# Patient Record
Sex: MALE | Race: WHITE | NOT HISPANIC OR LATINO | Employment: UNEMPLOYED | URBAN - METROPOLITAN AREA
[De-identification: names, ages, dates, MRNs, and addresses within clinical notes are randomized per-mention and may not be internally consistent; named-entity substitution may affect disease eponyms.]

---

## 2018-05-07 DIAGNOSIS — R55 SYNCOPE AND COLLAPSE: Primary | ICD-10-CM

## 2018-08-13 PROBLEM — T14.8XXA ABRASION: Status: ACTIVE | Noted: 2018-08-13

## 2018-12-12 ENCOUNTER — OFFICE VISIT (OUTPATIENT)
Dept: PODIATRY | Facility: CLINIC | Age: 38
End: 2018-12-12
Payer: MEDICAID

## 2018-12-12 VITALS
SYSTOLIC BLOOD PRESSURE: 135 MMHG | WEIGHT: 315 LBS | BODY MASS INDEX: 38.36 KG/M2 | HEIGHT: 76 IN | HEART RATE: 66 BPM | DIASTOLIC BLOOD PRESSURE: 84 MMHG

## 2018-12-12 DIAGNOSIS — B35.1 ONYCHOMYCOSIS DUE TO DERMATOPHYTE: ICD-10-CM

## 2018-12-12 DIAGNOSIS — L60.0 INGROWN NAIL OF GREAT TOE OF LEFT FOOT: Primary | ICD-10-CM

## 2018-12-12 PROCEDURE — 99999 PR PBB SHADOW E&M-EST. PATIENT-LVL III: CPT | Mod: PBBFAC,,, | Performed by: PODIATRIST

## 2018-12-12 PROCEDURE — 99202 OFFICE O/P NEW SF 15 MIN: CPT | Mod: S$PBB,,, | Performed by: PODIATRIST

## 2018-12-12 PROCEDURE — 99213 OFFICE O/P EST LOW 20 MIN: CPT | Mod: PBBFAC,PN | Performed by: PODIATRIST

## 2018-12-12 RX ORDER — LEVETIRACETAM 1000 MG/1
TABLET ORAL
Refills: 5 | COMMUNITY
Start: 2018-11-15

## 2018-12-12 RX ORDER — CARBAMAZEPINE 200 MG/1
CAPSULE, EXTENDED RELEASE ORAL
Refills: 3 | COMMUNITY
Start: 2018-12-09

## 2018-12-12 NOTE — PROGRESS NOTES
Subjective:      Patient ID: Dustin Crandall Jr. is a 38 y.o. male.    Chief Complaint: Nail Problem (Nail discoloration); Gout (Possible gout - left); and Other Misc (PCP:  Dr Benites - Dec 2018)    Dustin is a 38 y.o. male who presents to the clinic complaining of painful ingrown toenail on the left foot great toe medial border, there had been erythema and drainage to the area. He was put on an antibiotic and had been soaking the toe, he removed a large piece of nail from the area as well and it has been improving daily, he reports no pain to the area at this time. No other acute pedal complaints.     Review of Systems   Constitution: Negative for chills and fever.   Cardiovascular: Negative for claudication and leg swelling.   Respiratory: Negative for shortness of breath.    Skin: Positive for nail changes. Negative for itching and rash.   Musculoskeletal: Negative for muscle cramps, muscle weakness and myalgias.   Gastrointestinal: Negative for nausea and vomiting.   Neurological: Negative for focal weakness, loss of balance, numbness and paresthesias.           Objective:      Physical Exam   Constitutional: He is oriented to person, place, and time. He appears well-developed and well-nourished. No distress.   Cardiovascular:   Pulses:       Dorsalis pedis pulses are 2+ on the right side, and 2+ on the left side.        Posterior tibial pulses are 2+ on the right side, and 2+ on the left side.   < 3 sec capillary refill time to toes 1-5 bilateral. Toes and feet are warm to touch proximally with normal distal cooling b/l. There is some hair growth on the feet and toes b/l. There is no edema b/l. No spider veins or varicosities present b/l.      Musculoskeletal:   Equinus noted b/l ankles with < 10 deg DF noted. MMT 5/5 in DF/PF/Inv/Ev resistance with no reproduction of pain in any direction. Passive range of motion of ankle and pedal joints is painless b/l.     Neurological: He is alert and oriented to  person, place, and time. He has normal strength. He displays no atrophy and no tremor. No sensory deficit. He exhibits normal muscle tone.   Negative tinel sign bilateral.   Skin: Skin is warm, dry and intact. No abrasion, no bruising, no burn, no ecchymosis, no laceration, no lesion, no petechiae and no rash noted. He is not diaphoretic. No cyanosis or erythema. No pallor. Nails show no clubbing.   Skin temperature, texture and turgor within normal limits.    Medial hallux nail margin of left foot with ingrown nail plate. No surrounding erythema and minimal edema is noted there is no granuloma formation noted. No drainage or malodor.     Nails 1-5 bilateral are thick 3-4 mm, long 3-6 mm, and discolored with subungual debris to the bilateral hallux nails.             Psychiatric: He has a normal mood and affect. His behavior is normal.             Assessment:       Encounter Diagnoses   Name Primary?    Ingrown nail of great toe of left foot Yes    Onychomycosis due to dermatophyte          Plan:       Dustin was seen today for nail problem, gout and other misc.    Diagnoses and all orders for this visit:    Ingrown nail of great toe of left foot    Onychomycosis due to dermatophyte      I counseled the patient on his conditions, their implications and medical management.    For now the ingrown nail is not bothering him and the redness has resolved with the antibiotics.    Discussed in depth PNA with phenol matrixectomy if the nail bothers him again in the future, he will return at first sign the ingrown nail is back    Discussed treatment options for fungal toenails to include topical vs oral vs laser vs combined therapy in detail. Declined treatments    Return PRN    Efrain Irving DPM

## 2018-12-12 NOTE — LETTER
December 13, 2018      Samson Benites MD  [address] [place] [address] [address]  [facility] [address]           [facility]  [address] [facility] [address] [place] [address]  Phone: [phone]          Patient: Dustin Crandall Jr.   MR Number: [id number]   YOB: 1980   Date of Visit: 12/12/2018       Dear Dr. Samson Benites:    Thank you for referring Dustin Crandall to me for evaluation. Attached you will find relevant portions of my assessment and plan of care.    If you have questions, please do not hesitate to call me. I look forward to following Dustin Crandall along with you.    Sincerely,    Efrain Irving, CHELSEA    Enclosure  CC:  No Recipients    If you would like to receive this communication electronically, please contact [email or web address] or [phone] to request more information on [facility]_ Link access.    For providers and/or their staff who would like to refer a patient to [facility], please contact us through our one-stop-shop provider referral line, [facility], at [phone].    If you feel you have received this communication in error or would no longer like to receive these types of communications, please e-mail [email or web address]

## 2019-06-10 ENCOUNTER — CLINICAL SUPPORT (OUTPATIENT)
Dept: REHABILITATION | Facility: HOSPITAL | Age: 39
End: 2019-06-10
Attending: NURSE PRACTITIONER
Payer: MEDICAID

## 2019-06-10 DIAGNOSIS — M54.2 NECK PAIN: ICD-10-CM

## 2019-06-10 DIAGNOSIS — M25.60 LIMITED JOINT RANGE OF MOTION (ROM): ICD-10-CM

## 2019-06-10 PROCEDURE — 97161 PT EVAL LOW COMPLEX 20 MIN: CPT | Mod: PN | Performed by: PHYSICAL THERAPIST
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
## 2022-02-25 ENCOUNTER — TELEPHONE (OUTPATIENT)
Dept: NEUROLOGY | Facility: CLINIC | Age: 42
End: 2022-02-25

## 2022-06-10 ENCOUNTER — HOSPITAL ENCOUNTER (EMERGENCY)
Facility: HOSPITAL | Age: 42
Discharge: HOME | End: 2022-06-10
Attending: EMERGENCY MEDICINE
Payer: MEDICAID

## 2022-06-10 VITALS
HEIGHT: 75 IN | HEART RATE: 73 BPM | TEMPERATURE: 99 F | WEIGHT: 315 LBS | RESPIRATION RATE: 16 BRPM | BODY MASS INDEX: 39.17 KG/M2 | SYSTOLIC BLOOD PRESSURE: 128 MMHG | DIASTOLIC BLOOD PRESSURE: 58 MMHG | OXYGEN SATURATION: 96 %

## 2022-06-10 DIAGNOSIS — S46.911A SHOULDER STRAIN, RIGHT, INITIAL ENCOUNTER: ICD-10-CM
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
## 2022-06-10 DIAGNOSIS — S16.1XXA ACUTE STRAIN OF NECK MUSCLE, INITIAL ENCOUNTER: ICD-10-CM

## 2022-06-10 PROCEDURE — 99284 EMERGENCY DEPT VISIT MOD MDM: CPT | Mod: 25

## 2022-06-10 PROCEDURE — 25000003 PHARM REV CODE 250: Performed by: EMERGENCY MEDICINE

## 2022-06-10 RX ORDER — ACETAMINOPHEN 500 MG
1000 TABLET ORAL
Status: COMPLETED | OUTPATIENT
Start: 2022-06-10 | End: 2022-06-10

## 2022-06-10 RX ADMIN — ACETAMINOPHEN 1000 MG: 500 TABLET ORAL at 07:06
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
## 2022-10-19 ENCOUNTER — TELEPHONE (OUTPATIENT)
Dept: HEMATOLOGY/ONCOLOGY | Facility: CLINIC | Age: 42
End: 2022-10-19
Payer: MEDICAID

## 2022-10-19 DIAGNOSIS — D69.6 THROMBOCYTOPENIA: Primary | ICD-10-CM

## 2022-10-19 NOTE — TELEPHONE ENCOUNTER
Received paper referral from Sabrina Schmidt NP [facility] for dx of thrombocytopenia.   Called and scheduled pt for this Friday with Dr Hendricks.  Confirmed appt date time and location.

## 2022-10-20 ENCOUNTER — TELEPHONE (OUTPATIENT)
Dept: HEMATOLOGY/ONCOLOGY | Facility: CLINIC | Age: 42
End: 2022-10-20
Payer: MEDICAID

## 2022-10-21 ENCOUNTER — LAB VISIT (OUTPATIENT)
Dept: LAB | Facility: HOSPITAL | Age: 42
End: 2022-10-21
Attending: INTERNAL MEDICINE
Payer: MEDICAID

## 2022-10-21 ENCOUNTER — OFFICE VISIT (OUTPATIENT)
Dept: HEMATOLOGY/ONCOLOGY | Facility: CLINIC | Age: 42
End: 2022-10-21
Payer: MEDICAID

## 2022-10-21 DIAGNOSIS — D69.6 THROMBOCYTOPENIA: ICD-10-CM

## 2022-10-21 DIAGNOSIS — D69.59 THROMBOCYTOPENIA DUE TO ENHANCED DESTRUCTION, IMMUNE: ICD-10-CM

## 2022-10-21 DIAGNOSIS — D69.6 THROMBOCYTOPENIA: Primary | ICD-10-CM

## 2022-10-21 LAB
ALBUMIN SERPL BCP-MCNC: 3.7 G/DL (ref 3.5–5.2)
ALP SERPL-CCNC: 73 U/L (ref 55–135)
ALT SERPL W/O P-5'-P-CCNC: 28 U/L (ref 10–44)
ANION GAP SERPL CALC-SCNC: 6 MMOL/L (ref 8–16)
AST SERPL-CCNC: 32 U/L (ref 10–40)
BASOPHILS # BLD AUTO: 0.02 K/UL (ref 0–0.2)
BASOPHILS NFR BLD: 0.5 % (ref 0–1.9)
BILIRUB SERPL-MCNC: 0.5 MG/DL (ref 0.1–1)
BUN SERPL-MCNC: 9 MG/DL (ref 6–20)
CALCIUM SERPL-MCNC: 9.5 MG/DL (ref 8.7–10.5)
CHLORIDE SERPL-SCNC: 107 MMOL/L (ref 95–110)
CO2 SERPL-SCNC: 27 MMOL/L (ref 23–29)
CREAT SERPL-MCNC: 0.9 MG/DL (ref 0.5–1.4)
DIFFERENTIAL METHOD: ABNORMAL
EOSINOPHIL # BLD AUTO: 0 K/UL (ref 0–0.5)
EOSINOPHIL NFR BLD: 0 % (ref 0–8)
ERYTHROCYTE [DISTWIDTH] IN BLOOD BY AUTOMATED COUNT: 12.9 % (ref 11.5–14.5)
EST. GFR  (NO RACE VARIABLE): >60 ML/MIN/1.73 M^2
GLUCOSE SERPL-MCNC: 88 MG/DL (ref 70–110)
HBV CORE AB SERPL QL IA: NORMAL
HBV SURFACE AG SERPL QL IA: NORMAL
HCT VFR BLD AUTO: 44.6 % (ref 40–54)
HGB BLD-MCNC: 15 G/DL (ref 14–18)
HIV 1+2 AB+HIV1 P24 AG SERPL QL IA: NORMAL
IMM GRANULOCYTES # BLD AUTO: 0.01 K/UL (ref 0–0.04)
IMM GRANULOCYTES NFR BLD AUTO: 0.2 % (ref 0–0.5)
LYMPHOCYTES # BLD AUTO: 1.4 K/UL (ref 1–4.8)
LYMPHOCYTES NFR BLD: 32.6 % (ref 18–48)
MCH RBC QN AUTO: 31.3 PG (ref 27–31)
MCHC RBC AUTO-ENTMCNC: 33.6 G/DL (ref 32–36)
MCV RBC AUTO: 93 FL (ref 82–98)
MONOCYTES # BLD AUTO: 0.5 K/UL (ref 0.3–1)
MONOCYTES NFR BLD: 10.7 % (ref 4–15)
NEUTROPHILS # BLD AUTO: 2.4 K/UL (ref 1.8–7.7)
NEUTROPHILS NFR BLD: 56 % (ref 38–73)
NRBC BLD-RTO: 0 /100 WBC
PLATELET # BLD AUTO: 90 K/UL (ref 150–450)
PMV BLD AUTO: 10.2 FL (ref 9.2–12.9)
POTASSIUM SERPL-SCNC: 4.3 MMOL/L (ref 3.5–5.1)
PROT SERPL-MCNC: 7.3 G/DL (ref 6–8.4)
RBC # BLD AUTO: 4.79 M/UL (ref 4.6–6.2)
SODIUM SERPL-SCNC: 140 MMOL/L (ref 136–145)
WBC # BLD AUTO: 4.2 K/UL (ref 3.9–12.7)

## 2022-10-21 PROCEDURE — 99215 OFFICE O/P EST HI 40 MIN: CPT | Mod: PBBFAC,PN | Performed by: INTERNAL MEDICINE

## 2022-10-21 PROCEDURE — 86038 ANTINUCLEAR ANTIBODIES: CPT | Performed by: INTERNAL MEDICINE

## 2022-10-21 PROCEDURE — 4010F ACE/ARB THERAPY RXD/TAKEN: CPT | Mod: CPTII,,, | Performed by: INTERNAL MEDICINE

## 2022-10-21 PROCEDURE — 80053 COMPREHEN METABOLIC PANEL: CPT | Mod: PN | Performed by: INTERNAL MEDICINE

## 2022-10-21 PROCEDURE — 3044F HG A1C LEVEL LT 7.0%: CPT | Mod: CPTII,,, | Performed by: INTERNAL MEDICINE

## 2022-10-21 PROCEDURE — 86704 HEP B CORE ANTIBODY TOTAL: CPT | Performed by: INTERNAL MEDICINE

## 2022-10-21 PROCEDURE — 1159F PR MEDICATION LIST DOCUMENTED IN MEDICAL RECORD: ICD-10-PCS | Mod: CPTII,,, | Performed by: INTERNAL MEDICINE

## 2022-10-21 PROCEDURE — 84165 PROTEIN E-PHORESIS SERUM: CPT | Mod: 26,,, | Performed by: PATHOLOGY

## 2022-10-21 PROCEDURE — 36415 COLL VENOUS BLD VENIPUNCTURE: CPT | Mod: PN | Performed by: INTERNAL MEDICINE

## 2022-10-21 PROCEDURE — 85025 COMPLETE CBC W/AUTO DIFF WBC: CPT | Mod: PN | Performed by: INTERNAL MEDICINE

## 2022-10-21 PROCEDURE — 99205 OFFICE O/P NEW HI 60 MIN: CPT | Mod: S$PBB,,, | Performed by: INTERNAL MEDICINE

## 2022-10-21 PROCEDURE — 84165 PATHOLOGIST INTERPRETATION SPE: ICD-10-PCS | Mod: 26,,, | Performed by: PATHOLOGY

## 2022-10-21 PROCEDURE — 84165 PROTEIN E-PHORESIS SERUM: CPT | Performed by: INTERNAL MEDICINE

## 2022-10-21 PROCEDURE — 86677 HELICOBACTER PYLORI ANTIBODY: CPT | Performed by: INTERNAL MEDICINE

## 2022-10-21 PROCEDURE — 1159F MED LIST DOCD IN RCRD: CPT | Mod: CPTII,,, | Performed by: INTERNAL MEDICINE

## 2022-10-21 PROCEDURE — 4010F PR ACE/ARB THEARPY RXD/TAKEN: ICD-10-PCS | Mod: CPTII,,, | Performed by: INTERNAL MEDICINE

## 2022-10-21 PROCEDURE — 87340 HEPATITIS B SURFACE AG IA: CPT | Performed by: INTERNAL MEDICINE

## 2022-10-21 PROCEDURE — 87389 HIV-1 AG W/HIV-1&-2 AB AG IA: CPT | Performed by: INTERNAL MEDICINE

## 2022-10-21 PROCEDURE — 3044F PR MOST RECENT HEMOGLOBIN A1C LEVEL <7.0%: ICD-10-PCS | Mod: CPTII,,, | Performed by: INTERNAL MEDICINE

## 2022-10-21 PROCEDURE — 99999 PR PBB SHADOW E&M-EST. PATIENT-LVL V: ICD-10-PCS | Mod: PBBFAC,,, | Performed by: INTERNAL MEDICINE

## 2022-10-21 PROCEDURE — 99999 PR PBB SHADOW E&M-EST. PATIENT-LVL V: CPT | Mod: PBBFAC,,, | Performed by: INTERNAL MEDICINE

## 2022-10-21 PROCEDURE — 99205 PR OFFICE/OUTPT VISIT, NEW, LEVL V, 60-74 MIN: ICD-10-PCS | Mod: S$PBB,,, | Performed by: INTERNAL MEDICINE

## 2022-10-21 RX ORDER — CENOBAMATE 200 MG/1
200 TABLET, FILM COATED ORAL 2 TIMES DAILY
COMMUNITY
Start: 2022-09-22

## 2022-10-21 RX ORDER — ERGOCALCIFEROL 1.25 MG/1
50000 CAPSULE ORAL
COMMUNITY
Start: 2022-10-05

## 2022-10-21 NOTE — PROGRESS NOTES
Subjective:       Patient ID: Dustin Crandall Jr. is a 42 y.o. male.    Chief Complaint: Abnormal Labs    HPI    Mr Crandall is a 42-year-old male referred for evaluation for thrombocytopenia.  The only CBC available within our system is from early September 2022 and had shown a white count of 4200 per cubic mm, hemoglobin 15.2 grams/deciliter, hematocrit 45%, MCV 94 and platelets 89088 per cubic mm.      PAST MEDICAL HISTORY:  Significant significant for diabetes, hypertension, and seizure disorder.  He states that he was diagnosed with COVID in late December 2021 and was hospitalized at [facility].  He states that during his hospitalization he was diagnosed with a clot in his lungs and he was discharged on apixaban.  He is still on apixaban.  Some of the nose that I reviewed indicate that he has a history of atrial fibrillation.  PAST SURGICAL HISTORY:  Significant for insertion of a loop recorder.  SOCIAL HISTORY:  He is .  He used to work in the [employer] business but is currently on disability.  He quit smoking 20 years ago.  He denies ETOH consumption.  FAMILY HISTORY:  Negative for cancer  MEDICATIONS AND ALLERGIES: Reviewed    Review of Systems    Overall he feels well, however, he complains of fatigue and shortness of breath with exertion, and generalized weakness.  He also complains of numbness of his upper extremity and has been diagnosed with diabetic neuropathy.  He denies any anxiety, depression, easy bruising, fevers, chills, night  sweats, weight loss, nausea, vomiting, diarrhea, constipation, diplopia, blurred vision, headache, chest pain, palpitations,  breast pain, abdominal pain, extremity pain, or difficulty ambulating.  The remainder of the ten-point ROS, including general, skin, lymph, H/N, cardiorespiratory, GI, , Neuro, Endocrine, and psychiatric is negative.    Objective:      Physical Exam    He is alert, oriented to time, place, person, pleasant, well      nourished, in no  acute physical distress.                                    VITAL SIGNS:  Reviewed.  He is morbidly obese                                     HEENT:  Normal.  There are no nasal, oral, lip, gingival, auricular, lid,    or conjunctival lesions.  Mucosae are moist and pink, and there is no        thrush.  Pupils are equal, reactive to light and accommodation.              Extraocular muscle movements are intact.    Dentition is good.  There is no frontal or maxillary tenderness.                                   NECK:  Supple without JVD, adenopathy, or thyromegaly.                       LUNGS:  Clear to auscultation without wheezing, rales, or rhonchi.           CARDIOVASCULAR:  Reveals an S1, S2, no murmurs, no rubs, no gallops.  A loop recorder is palpated in the left pectoral area        ABDOMEN:  Soft, obese nontender, without organomegaly.  Bowel sounds are    present.  Gynecomastia is noted                                                                   EXTREMITIES:  No cyanosis, clubbing, or edema.                                                               LYMPHATIC:  There is no cervical, axillary, or supraclavicular adenopathy.   SKIN:  Warm and moist, without petechiae, rashes, induration, or ecchymoses.           NEUROLOGIC:  DTRs are 0-1+ bilaterally, symmetrical, motor function is 5/5,  and cranial nerves are  within normal limits.   Assessment:      1. Mild thrombocytopenia of unknown duration.  Etiology unclear, however I suspect this may be secondary to cirrhosis from VELAZQUEZ with splenomegaly or ITP      2. Diabetes, hypertension      3. Morbid obesity       4. History of seizures       5. History of PE during a COVID infection  Plan:         I would a very long discussion with him.  We went over the differential for thrombocytopenia.  I explained to him that out of 10 patients with thrombocytopenia in the  K range 9 will still be in that range a decade down the road without the need for  treatment.  I also explained to him that so long as his platelets are above 50,000 per cubic mm he is not at risk for bleeding.  He voiced understanding.  At this point we will proceed as follows:  Will have repeat CBC today  We will check a CMP primarily to see whether his LFTs are OK  We will check an SPEP  Will check an HIV test, IgG H pylori, and hepatitis panel  Will check an DORA level  We will obtain an ultrasound the liver and spleen to determine whether his splenomegaly.  I will see him in a week to discuss the results of the above tests.  Finally, once I complete his workup, I will communicate with his primary care physician.  I am not sure why he is still on apixaban however, some of the notes I have reviewed indicate that he has a history of atrial fibrillation.  That would explain the need for lifelong anticoagulation, even though he is in normal sinus rhythm today.  His multiple questions were answered to his satisfaction.

## 2022-10-24 LAB
ALBUMIN SERPL ELPH-MCNC: 3.83 G/DL (ref 3.35–5.55)
ALPHA1 GLOB SERPL ELPH-MCNC: 0.19 G/DL (ref 0.17–0.41)
ALPHA2 GLOB SERPL ELPH-MCNC: 0.85 G/DL (ref 0.43–0.99)
ANA SER QL IF: NORMAL
B-GLOBULIN SERPL ELPH-MCNC: 0.68 G/DL (ref 0.5–1.1)
GAMMA GLOB SERPL ELPH-MCNC: 1.35 G/DL (ref 0.67–1.58)
PROT SERPL-MCNC: 6.9 G/DL (ref 6–8.4)

## 2022-10-25 LAB — H PYLORI IGG SERPL QL IA: NORMAL

## 2022-10-26 LAB — PATHOLOGIST INTERPRETATION SPE: NORMAL

## 2022-10-27 ENCOUNTER — HOSPITAL ENCOUNTER (OUTPATIENT)
Dept: RADIOLOGY | Facility: HOSPITAL | Age: 42
Discharge: HOME | End: 2022-10-27
Attending: INTERNAL MEDICINE
Payer: MEDICAID

## 2022-10-27 DIAGNOSIS — D69.6 THROMBOCYTOPENIA: ICD-10-CM

## 2022-10-27 PROCEDURE — 76705 US ABDOMEN LIMITED: ICD-10-PCS | Mod: 26,,, | Performed by: RADIOLOGY

## 2022-10-27 PROCEDURE — 76705 ECHO EXAM OF ABDOMEN: CPT | Mod: 26,,, | Performed by: RADIOLOGY

## 2022-10-27 PROCEDURE — 76705 ECHO EXAM OF ABDOMEN: CPT | Mod: TC,PO

## 2022-10-28 ENCOUNTER — OFFICE VISIT (OUTPATIENT)
Dept: HEMATOLOGY/ONCOLOGY | Facility: CLINIC | Age: 42
End: 2022-10-28
Payer: MEDICAID

## 2022-10-28 DIAGNOSIS — D69.59 THROMBOCYTOPENIA DUE TO ENHANCED DESTRUCTION, IMMUNE: Primary | ICD-10-CM

## 2022-10-28 PROCEDURE — 4010F PR ACE/ARB THEARPY RXD/TAKEN: ICD-10-PCS | Mod: CPTII,95,, | Performed by: INTERNAL MEDICINE

## 2022-10-28 PROCEDURE — 3044F PR MOST RECENT HEMOGLOBIN A1C LEVEL <7.0%: ICD-10-PCS | Mod: CPTII,95,, | Performed by: INTERNAL MEDICINE

## 2022-10-28 PROCEDURE — 4010F ACE/ARB THERAPY RXD/TAKEN: CPT | Mod: CPTII,95,, | Performed by: INTERNAL MEDICINE

## 2022-10-28 PROCEDURE — 99214 PR OFFICE/OUTPT VISIT, EST, LEVL IV, 30-39 MIN: ICD-10-PCS | Mod: 95,,, | Performed by: INTERNAL MEDICINE

## 2022-10-28 PROCEDURE — 99214 OFFICE O/P EST MOD 30 MIN: CPT | Mod: 95,,, | Performed by: INTERNAL MEDICINE

## 2022-10-28 PROCEDURE — 3044F HG A1C LEVEL LT 7.0%: CPT | Mod: CPTII,95,, | Performed by: INTERNAL MEDICINE

## 2022-10-28 NOTE — PROGRESS NOTES
Subjective:       Patient ID: Dustin Crandall Jr. is a 42 y.o. male.    Chief Complaint: No chief complaint on file.    HPI        The patient location is: home  The chief complaint leading to consultation is: thrombocytopenia      Visit type: audiovisual    Face to Face time with patient: 10  More than 30 minutes of total time spent on the encounter, which includes face to face time and non-face to face time preparing to see the patient (eg, review of tests), Obtaining and/or reviewing separately obtained history, Documenting clinical information in the electronic or other health record, Independently interpreting results (not separately reported) and communicating results to the patient/family/caregiver, or Care coordination (not separately reported).         Each patient to whom he or she provides medical services by telemedicine is:  (1) informed of the relationship between the physician and patient and the respective role of any other health care provider with respect to management of the patient; and (2) notified that he or she may decline to receive medical services by telemedicine and may withdraw from such care at any time.    Notes:    Mr. Diego had a tele visit with me today.  Briefly, he is a 42-year-old male referred for evaluation for thrombocytopenia.  I had seen him for his initial visit last week and had initiated his workup, which is as follows:  WBCs are 4,200 per cubic mm, hemoglobin is 15 grams/deciliter, hematocrit is 44.6% and platelets are 90,000 per cubic mm.  HIV is negative  SPEP does not show any monoclonal spike  There is no evidence of hepatitis-B infection  There is no evidence of H pylori infection  His liver is enlarged measuring 21 cm while his spleen is also enlarged measuring 16 cm.     Of note, he had been diagnosed with COVID in late December 2021 and was hospitalized at [facility].  During his hospitalization he was diagnosed with a PE and he was discharged on apixaban.   He is still on apixaban.      Review of Systems    Overall he feels well, however, he complains of fatigue and shortness of breath with exertion, and generalized weakness.  He also complains of numbness of his upper extremity and has been diagnosed with diabetic neuropathy.  He denies any anxiety, depression, easy bruising, fevers, chills, night  sweats, weight loss, nausea, vomiting, diarrhea, constipation, diplopia, blurred vision, headache, chest pain, palpitations,  breast pain, abdominal pain, extremity pain, or difficulty ambulating.  The remainder of the ten-point ROS, including general, skin, lymph, H/N, cardiorespiratory, GI, , Neuro, Endocrine, and psychiatric is negative.    Objective:      Physical Exam    Deferred.  This was a virtual visit    Assessment:      1. Mild thrombocytopenia of unknown duration.  I suspect this is secondary to his splenomegaly      2. Hepatosplenomegaly.  I suspect VELAZQUEZ/cirrhosis      2. Diabetes, hypertension      3. Morbid obesity       4. History of seizures       5. History of PE during a COVID infection  Plan:         I would a very long discussion with him.  I again explained to him that his thrombocytopenia does not require any specific treatment at this point and that 9 out of 10 patients with thrombocytopenia in the  K range will still be in that range a decade later without the need for any specific treatment.  I recommended that he see a hepatologist to discuss his splenomegaly and probable VELAZQUEZ.  He is agreeable.  I will ask Dr. Noe to see him.  I will see him again with a repeat CBC in 6 months.  His multiple questions were answered to his satisfaction.  Finally, we will fax today's note to his primary care physician in [place].  I am not sure why he is still on apixaban however, some of the notes I have reviewed indicate that he has a history of atrial fibrillation.  That would explain the recommendation for lifelong anticoagulation.  His multiple  questions were answered to his satisfaction.

## 2022-11-23 ENCOUNTER — TELEPHONE (OUTPATIENT)
Dept: HEPATOLOGY | Facility: CLINIC | Age: 42
End: 2022-11-23
Payer: MEDICAID

## 2022-11-23 NOTE — TELEPHONE ENCOUNTER
Call to patient to see if he would need directions getting to his appt. Had to leave a voice message. Also left direct number to this clinic [phone]  To call back where we could reschedule his appt

## 2024-07-17 ENCOUNTER — TELEPHONE (OUTPATIENT)
Dept: HEMATOLOGY/ONCOLOGY | Facility: CLINIC | Age: 44
End: 2024-07-17
Payer: MEDICAID

## 2024-07-17 ENCOUNTER — TELEPHONE (OUTPATIENT)
Dept: TRANSPLANT | Facility: CLINIC | Age: 44
End: 2024-07-17
Payer: MEDICAID

## 2024-07-17 NOTE — TELEPHONE ENCOUNTER
----- Message from Sanju Calix sent at 7/17/2024  3:06 PM CDT -----  Regarding: FW: Referral Check    Returned call to pt and told them faraz we do not have any rec son him. They told me that last saw the MD in 2022 and was told that he need to see hepat. Told them we do not have any up dated recs on him. Pt said that he Dr. Bhanu Quiroz at [facility] where he did labs and had a PET scan done. Pt stated that he was faye'ed for a bx, but was cx'ed due to the location of the area.     Will submit recs via [employer].  .  ----- Message -----  From: Ksenia Nj  Sent: 7/17/2024   2:43 PM CDT  To: [facility] Liver Referral Pool  Subject: Referral Check                                         Name Of Caller:   Bina (Pt's Sister)      Contact Preference:   [phone]      Nature of call:    Calling to verify whether the txp office received the pt's referral. It was sent today by Dr. Ventura.

## 2024-07-17 NOTE — TELEPHONE ENCOUNTER
"----- Message from Ksenia Nj sent at 7/17/2024 12:11 PM CDT -----  Regarding: Consult/Advisory         Name Of Caller: Self     Contact Preference?:   [phone]      Provider Name:    Rimalauritachristiano     Does patient feel the need to be seen today? No     What is the nature of the call?:  Pt has some questions regarding their care and diagnosis.        Additional Notes:  "Thank you for all that you do for our patients  "

## 2024-07-17 NOTE — TELEPHONE ENCOUNTER
Pt requesting information on why hepatology referral was needed  during last clinic visit with Dr. Hendricks (10/28/2022). Referral to Dr. Noe for probable VELAZQUEZ and splenomegaly. Pt v/u and states will follow up for further evaluation as recommended by Dr. Hendricks.

## 2024-07-19 ENCOUNTER — TELEPHONE (OUTPATIENT)
Dept: HEPATOLOGY | Facility: CLINIC | Age: 44
End: 2024-07-19
Payer: MEDICAID

## 2024-07-19 NOTE — TELEPHONE ENCOUNTER
Patient has been advised to visit with this PCP, until Dr. Noe is back from vacation (as per Dr. Noe).  Patient confirmed provider's advice.        ----- Message from Ksenia Nj sent at 7/17/2024 12:08 PM CDT -----  Regarding: Appointments      Name Of Caller:   Dustin      Contact Preference:  [phone]       Nature of call:   Pt would like to schedule a Np appt with Dr. Noe. He canceled his last appt on 12/16/2022.

## 2024-07-24 ENCOUNTER — TELEPHONE (OUTPATIENT)
Dept: HEPATOLOGY | Facility: CLINIC | Age: 44
End: 2024-07-24
Payer: MEDICAID

## 2024-07-26 ENCOUNTER — TELEPHONE (OUTPATIENT)
Dept: HEPATOLOGY | Facility: CLINIC | Age: 44
End: 2024-07-26
Payer: MEDICAID

## 2024-07-29 ENCOUNTER — TELEPHONE (OUTPATIENT)
Dept: HEPATOLOGY | Facility: CLINIC | Age: 44
End: 2024-07-29
Payer: MEDICAID

## 2024-07-29 ENCOUNTER — TELEPHONE (OUTPATIENT)
Dept: TRANSPLANT | Facility: CLINIC | Age: 44
End: 2024-07-29
Payer: MEDICAID

## 2024-07-29 NOTE — TELEPHONE ENCOUNTER
----- Message from Sanju Calix sent at 7/29/2024 11:34 AM CDT -----    Updated Hepatology recs received and scanned into media; pt chart sent to referral nurse for medical review.     Referring Provider: SELF  .

## 2024-07-29 NOTE — TELEPHONE ENCOUNTER
An appointment has been scheduled with Dr. Christine Noe on Thursday, October 10, 2024 at 10:30AM.  Patient confirmed. A copy of the appointment has been mailed out.  Dr. Lior Ventura office have been contacted for an updated referral, spoke with [place].    Direct office    [phone]  Fax                 [phone]

## 2024-07-30 ENCOUNTER — TELEPHONE (OUTPATIENT)
Dept: HEMATOLOGY/ONCOLOGY | Facility: CLINIC | Age: 44
End: 2024-07-30
Payer: MEDICAID

## 2024-07-30 DIAGNOSIS — D69.6 THROMBOCYTOPENIA: ICD-10-CM

## 2024-07-30 DIAGNOSIS — D69.59 THROMBOCYTOPENIA DUE TO ENHANCED DESTRUCTION, IMMUNE: Primary | ICD-10-CM

## 2024-07-30 NOTE — TELEPHONE ENCOUNTER
----- Message from Vijay Hanna sent at 7/30/2024  9:17 AM CDT -----  Regarding: referral update  Contact: Bina Hines@ [phone]  .Consult/Advisory     Name Of Caller: Bina whelan sister        Contact Preference: [phone]     Nature of call: Update on referral to Christine Noe MD

## 2024-07-30 NOTE — TELEPHONE ENCOUNTER
Returned pt's sister's phone call.  She stated she is calling to let us know hepatology dept informed her mother that Dr Hendricks needs to place a referral.  Referral placed today, and reinforced appt with Dr Christine Noe, hepatologist, for 10/10 @ 10:30; also placed appt on waiting list, per request.    ----- Message from Vijay Hanna sent at 7/30/2024  9:17 AM CDT -----  Regarding: referral update  Contact: Bina Hines@ [phone]  .Consult/Advisory     Name Of Caller: Bina pt sister        Contact Preference: [phone]     Nature of call: Update on referral to Christine Noe MD

## 2024-10-09 ENCOUNTER — TELEPHONE (OUTPATIENT)
Dept: HEPATOLOGY | Facility: CLINIC | Age: 44
End: 2024-10-09
Payer: MEDICAID

## 2024-10-09 NOTE — TELEPHONE ENCOUNTER
----- Message from Vijay sent at 10/9/2024  9:32 AM CDT -----  Regarding: Scheduling Request  Contact: [phone]  Scheduling Request           Appt Type:  Np      Date/Time Preference: Next available     Caller Name: pt mother      Contact Preference: [phone] or      Comment: Would like to r/s 10/10 that was canceled, please call to advise thanks

## 2024-10-09 NOTE — TELEPHONE ENCOUNTER
Call returned to the patient to reschedule missed appt.  Patient requested to be seen by Dr. Noe.  Scheduled to the next available appt 11/27/2024.  Patient confirmed and agreed with the appt.  Reminder letter mailed.

## 2024-12-02 ENCOUNTER — TELEPHONE (OUTPATIENT)
Dept: NEUROLOGY | Facility: CLINIC | Age: 44
End: 2024-12-02
Payer: MEDICAID

## 2024-12-02 DIAGNOSIS — R56.9 SEIZURES: Primary | ICD-10-CM

## 2024-12-10 ENCOUNTER — TELEPHONE (OUTPATIENT)
Dept: NEUROLOGY | Facility: CLINIC | Age: 44
End: 2024-12-10
Payer: MEDICAID

## 2024-12-10 DIAGNOSIS — R56.9 SEIZURES: Primary | ICD-10-CM

## 2024-12-10 NOTE — TELEPHONE ENCOUNTER
----- Message from RN Tierney sent at 12/10/2024  8:06 AM CST -----  Regarding: FW: Pt returned call for Tierney unable to reach out states she's unavail at this time  Contact: [phone]    ----- Message -----  From: Tracy Arce  Sent: 12/9/2024  10:43 AM CST  To: Shani Garcia Staff  Subject: Pt returned call for Tierney unable to reach #    Type:  Patient Returning Call        Who Called:Linsey        Who Left Message for Patient:Tierney        Does the patient know what this is regarding?Pt returned call for Tierney unable to reach out states she's unavail at this time. Please advise         Best Call Back Number:[phone]        Additional Information:   22

## 2024-12-10 NOTE — TELEPHONE ENCOUNTER
EMU Scheduled 1/23/25, 11am. Aware an adult is required to accompany him for the duration including overnight. Aware he will be connected to lines to his head, chest, arm, have an IV placed; will not be able to leave the room until all equipment is removed at discharge. Instructions thoroughly discussed; mailed via [employer]. Reports he does wear a CPAP, checks his BG at home, and has a loop recorder not in use. He has been instructed to bring his CPAP from home with him for this admission and that we will be checking his BG while admitted. V/U.

## 2025-01-07 ENCOUNTER — TELEPHONE (OUTPATIENT)
Dept: NEUROLOGY | Facility: CLINIC | Age: 45
End: 2025-01-07
Payer: MEDICAID

## 2025-01-20 ENCOUNTER — TELEPHONE (OUTPATIENT)
Dept: NEUROLOGY | Facility: CLINIC | Age: 45
End: 2025-01-20
Payer: MEDICAID

## 2025-01-20 NOTE — TELEPHONE ENCOUNTER
Patient has cancelled EMU admission 1/23/25 2/2 weather. I offered to hold the admission until Wednesday so we could see what the weather will do and he declined this option. I did make him aware that canceling the admission also cancels the approval on file and it is not a guarantee that it will be approved again but we will try our best to get this approved once we reschedule. V/U. I have called admitting, [id number], and canceled this w/ James  
Home

## 2025-01-27 ENCOUNTER — TELEPHONE (OUTPATIENT)
Dept: NEUROLOGY | Facility: CLINIC | Age: 45
End: 2025-01-27
Payer: MEDICAID

## 2025-01-27 DIAGNOSIS — R56.9 SEIZURES: Primary | ICD-10-CM

## 2025-01-27 NOTE — TELEPHONE ENCOUNTER
Rescheduled EMU 2/17/25, 11am. Originally scheduled 1/23/25 and cancelled due to snow. Aware an adult is required to accompany his for the duration including overnight. Aware he will be connected to lines to his head, chest, arm, have an IV placed; will not be able to leave the room until all equipment is removed at discharge. Instructions with updated date and time of admission mailed via [employer]

## 2025-01-31 ENCOUNTER — TELEPHONE (OUTPATIENT)
Dept: NEUROLOGY | Facility: CLINIC | Age: 45
End: 2025-01-31
Payer: MEDICAID

## 2025-02-17 ENCOUNTER — DOCUMENTATION ONLY (OUTPATIENT)
Dept: NEUROLOGY | Facility: CLINIC | Age: 45
End: 2025-02-17
Payer: MEDICAID

## 2025-02-17 ENCOUNTER — HOSPITAL ENCOUNTER (INPATIENT)
Facility: HOSPITAL | Age: 45
LOS: 6 days | Discharge: HOME | DRG: 101 | End: 2025-02-23
Attending: PSYCHIATRY & NEUROLOGY | Admitting: PSYCHIATRY & NEUROLOGY
Payer: MEDICAID

## 2025-02-17 DIAGNOSIS — R56.9 SEIZURES: ICD-10-CM

## 2025-02-17 DIAGNOSIS — R94.31 QT PROLONGATION: ICD-10-CM

## 2025-02-17 PROBLEM — E78.5 HLD (HYPERLIPIDEMIA): Status: ACTIVE | Noted: 2025-02-17

## 2025-02-17 PROBLEM — G47.33 OSA (OBSTRUCTIVE SLEEP APNEA): Status: ACTIVE | Noted: 2025-02-17

## 2025-02-17 PROBLEM — I10 HTN (HYPERTENSION): Status: ACTIVE | Noted: 2025-02-17

## 2025-02-17 PROBLEM — I48.91 ATRIAL FIBRILLATION: Status: ACTIVE | Noted: 2025-02-17

## 2025-02-17 LAB
ALBUMIN SERPL BCP-MCNC: 3.4 G/DL (ref 3.5–5.2)
ALP SERPL-CCNC: 72 U/L (ref 40–150)
ALT SERPL W/O P-5'-P-CCNC: 25 U/L (ref 10–44)
AMPHET+METHAMPHET UR QL: NEGATIVE
ANION GAP SERPL CALC-SCNC: 6 MMOL/L (ref 8–16)
AST SERPL-CCNC: 32 U/L (ref 10–40)
BARBITURATES UR QL SCN>200 NG/ML: NEGATIVE
BASOPHILS # BLD AUTO: 0.02 K/UL (ref 0–0.2)
BASOPHILS NFR BLD: 0.6 % (ref 0–1.9)
BENZODIAZ UR QL SCN>200 NG/ML: NEGATIVE
BILIRUB SERPL-MCNC: 0.5 MG/DL (ref 0.1–1)
BUN SERPL-MCNC: 11 MG/DL (ref 6–20)
BZE UR QL SCN: NEGATIVE
CALCIUM SERPL-MCNC: 8.7 MG/DL (ref 8.7–10.5)
CANNABINOIDS UR QL SCN: NEGATIVE
CHLORIDE SERPL-SCNC: 108 MMOL/L (ref 95–110)
CO2 SERPL-SCNC: 25 MMOL/L (ref 23–29)
CREAT SERPL-MCNC: 0.7 MG/DL (ref 0.5–1.4)
CREAT UR-MCNC: 164 MG/DL (ref 23–375)
DIFFERENTIAL METHOD BLD: ABNORMAL
EOSINOPHIL # BLD AUTO: 0 K/UL (ref 0–0.5)
EOSINOPHIL NFR BLD: 0 % (ref 0–8)
ERYTHROCYTE [DISTWIDTH] IN BLOOD BY AUTOMATED COUNT: 13.2 % (ref 11.5–14.5)
EST. GFR  (NO RACE VARIABLE): >60 ML/MIN/1.73 M^2
GLUCOSE SERPL-MCNC: 87 MG/DL (ref 70–110)
HCT VFR BLD AUTO: 44.6 % (ref 40–54)
HGB BLD-MCNC: 14.7 G/DL (ref 14–18)
IMM GRANULOCYTES # BLD AUTO: 0.01 K/UL (ref 0–0.04)
IMM GRANULOCYTES NFR BLD AUTO: 0.3 % (ref 0–0.5)
LYMPHOCYTES # BLD AUTO: 1.2 K/UL (ref 1–4.8)
LYMPHOCYTES NFR BLD: 33.5 % (ref 18–48)
MCH RBC QN AUTO: 30.9 PG (ref 27–31)
MCHC RBC AUTO-ENTMCNC: 33 G/DL (ref 32–36)
MCV RBC AUTO: 94 FL (ref 82–98)
METHADONE UR QL SCN>300 NG/ML: NEGATIVE
MONOCYTES # BLD AUTO: 0.4 K/UL (ref 0.3–1)
MONOCYTES NFR BLD: 12.2 % (ref 4–15)
NEUTROPHILS # BLD AUTO: 1.9 K/UL (ref 1.8–7.7)
NEUTROPHILS NFR BLD: 53.4 % (ref 38–73)
NRBC BLD-RTO: 0 /100 WBC
OHS QRS DURATION: 112 MS
OHS QRS DURATION: 112 MS
OHS QTC CALCULATION: 447 MS
OHS QTC CALCULATION: 447 MS
OPIATES UR QL SCN: NEGATIVE
PCP UR QL SCN>25 NG/ML: NEGATIVE
PLATELET # BLD AUTO: 81 K/UL (ref 150–450)
PMV BLD AUTO: 10.2 FL (ref 9.2–12.9)
POTASSIUM SERPL-SCNC: 4.3 MMOL/L (ref 3.5–5.1)
PROT SERPL-MCNC: 6.9 G/DL (ref 6–8.4)
RBC # BLD AUTO: 4.76 M/UL (ref 4.6–6.2)
SODIUM SERPL-SCNC: 139 MMOL/L (ref 136–145)
TOXICOLOGY INFORMATION: NORMAL
WBC # BLD AUTO: 3.52 K/UL (ref 3.9–12.7)

## 2025-02-17 PROCEDURE — 80177 DRUG SCRN QUAN LEVETIRACETAM: CPT

## 2025-02-17 PROCEDURE — 80299 QUANTITATIVE ASSAY DRUG: CPT

## 2025-02-17 PROCEDURE — 99900035 HC TECH TIME PER 15 MIN (STAT)

## 2025-02-17 PROCEDURE — A4216 STERILE WATER/SALINE, 10 ML: HCPCS

## 2025-02-17 PROCEDURE — 25000003 PHARM REV CODE 250

## 2025-02-17 PROCEDURE — 95700 EEG CONT REC W/VID EEG TECH: CPT

## 2025-02-17 PROCEDURE — 36415 COLL VENOUS BLD VENIPUNCTURE: CPT

## 2025-02-17 PROCEDURE — 99223 1ST HOSP IP/OBS HIGH 75: CPT | Mod: ,,, | Performed by: PSYCHIATRY & NEUROLOGY

## 2025-02-17 PROCEDURE — 25000003 PHARM REV CODE 250: Performed by: PSYCHIATRY & NEUROLOGY

## 2025-02-17 PROCEDURE — 95720 EEG PHY/QHP EA INCR W/VEEG: CPT | Mod: ,,, | Performed by: PSYCHIATRY & NEUROLOGY

## 2025-02-17 PROCEDURE — 80053 COMPREHEN METABOLIC PANEL: CPT

## 2025-02-17 PROCEDURE — 85025 COMPLETE CBC W/AUTO DIFF WBC: CPT

## 2025-02-17 PROCEDURE — 93010 ELECTROCARDIOGRAM REPORT: CPT | Mod: ,,, | Performed by: INTERNAL MEDICINE

## 2025-02-17 PROCEDURE — 93005 ELECTROCARDIOGRAM TRACING: CPT

## 2025-02-17 PROCEDURE — 95714 VEEG EA 12-26 HR UNMNTR: CPT

## 2025-02-17 PROCEDURE — 80307 DRUG TEST PRSMV CHEM ANLYZR: CPT

## 2025-02-17 PROCEDURE — 11000001 HC ACUTE MED/SURG PRIVATE ROOM

## 2025-02-17 PROCEDURE — 94761 N-INVAS EAR/PLS OXIMETRY MLT: CPT

## 2025-02-17 RX ORDER — DOCUSATE SODIUM 100 MG/1
100 CAPSULE, LIQUID FILLED ORAL DAILY PRN
Status: DISCONTINUED | OUTPATIENT
Start: 2025-02-17 | End: 2025-02-23 | Stop reason: HOSPADM

## 2025-02-17 RX ORDER — LISINOPRIL 20 MG/1
40 TABLET ORAL DAILY
Status: DISCONTINUED | OUTPATIENT
Start: 2025-02-18 | End: 2025-02-23 | Stop reason: HOSPADM

## 2025-02-17 RX ORDER — LISINOPRIL 20 MG/1
40 TABLET ORAL DAILY
Status: DISCONTINUED | OUTPATIENT
Start: 2025-02-17 | End: 2025-02-17

## 2025-02-17 RX ORDER — LEVETIRACETAM 500 MG/1
1000 TABLET ORAL 2 TIMES DAILY
Status: DISCONTINUED | OUTPATIENT
Start: 2025-02-17 | End: 2025-02-18

## 2025-02-17 RX ORDER — PAROXETINE 10 MG/1
40 TABLET, FILM COATED ORAL EVERY MORNING
Status: DISCONTINUED | OUTPATIENT
Start: 2025-02-18 | End: 2025-02-17

## 2025-02-17 RX ORDER — ATORVASTATIN CALCIUM 10 MG/1
10 TABLET, FILM COATED ORAL DAILY
Status: DISCONTINUED | OUTPATIENT
Start: 2025-02-18 | End: 2025-02-17

## 2025-02-17 RX ORDER — PAROXETINE 10 MG/1
40 TABLET, FILM COATED ORAL EVERY MORNING
Status: DISCONTINUED | OUTPATIENT
Start: 2025-02-17 | End: 2025-02-17

## 2025-02-17 RX ORDER — SODIUM CHLORIDE 0.9 % (FLUSH) 0.9 %
10 SYRINGE (ML) INJECTION
Status: DISCONTINUED | OUTPATIENT
Start: 2025-02-17 | End: 2025-02-23 | Stop reason: HOSPADM

## 2025-02-17 RX ORDER — SOTALOL HYDROCHLORIDE 80 MG/1
80 TABLET ORAL EVERY 12 HOURS
Status: DISCONTINUED | OUTPATIENT
Start: 2025-02-17 | End: 2025-02-23 | Stop reason: HOSPADM

## 2025-02-17 RX ORDER — LORAZEPAM 2 MG/ML
2 INJECTION INTRAMUSCULAR EVERY 10 MIN PRN
Status: DISCONTINUED | OUTPATIENT
Start: 2025-02-17 | End: 2025-02-23 | Stop reason: HOSPADM

## 2025-02-17 RX ORDER — ATORVASTATIN CALCIUM 10 MG/1
10 TABLET, FILM COATED ORAL NIGHTLY
Status: DISCONTINUED | OUTPATIENT
Start: 2025-02-17 | End: 2025-02-23 | Stop reason: HOSPADM

## 2025-02-17 RX ORDER — SOTALOL HYDROCHLORIDE 80 MG/1
80 TABLET ORAL EVERY 12 HOURS
Status: DISCONTINUED | OUTPATIENT
Start: 2025-02-17 | End: 2025-02-17

## 2025-02-17 RX ORDER — ATORVASTATIN CALCIUM 10 MG/1
10 TABLET, FILM COATED ORAL DAILY
Status: DISCONTINUED | OUTPATIENT
Start: 2025-02-17 | End: 2025-02-17

## 2025-02-17 RX ORDER — ACETAMINOPHEN 325 MG/1
650 TABLET ORAL EVERY 4 HOURS PRN
Status: DISCONTINUED | OUTPATIENT
Start: 2025-02-17 | End: 2025-02-23 | Stop reason: HOSPADM

## 2025-02-17 RX ADMIN — APIXABAN 5 MG: 5 TABLET, FILM COATED ORAL at 08:02

## 2025-02-17 RX ADMIN — LEVETIRACETAM 1000 MG: 500 TABLET, FILM COATED ORAL at 08:02

## 2025-02-17 RX ADMIN — ATORVASTATIN CALCIUM 10 MG: 10 TABLET, FILM COATED ORAL at 08:02

## 2025-02-17 RX ADMIN — SOTALOL HYDROCHLORIDE 80 MG: 80 TABLET ORAL at 08:02

## 2025-02-17 RX ADMIN — CENOBAMATE 200 MG: 100 TABLET, FILM COATED ORAL at 08:02

## 2025-02-17 RX ADMIN — SODIUM CHLORIDE, PRESERVATIVE FREE 10 ML: 5 INJECTION INTRAVENOUS at 08:02
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
## 2025-02-17 NOTE — ASSESSMENT & PLAN NOTE
This is a 44 year old male with a past history of HTN, HLD, atrial fibrillation on eliquis, LISA, fatty liver, depression awith anxiety, and seizure disorder that is being admitted to the EMU for spell capture and characterization. Patient's history somewhat concerning for psychogenic events, however reports his GTC episodes were treated with Xcopri. Patient reports that episode frequency is daily to a couple of times per week, even while on AEDs. Will continue monitoring with long term video EEG for spell capture and characterization. Will initiate monitoring while on AEDs, but will downtitrate medication and institute activiation measures as needed to provoke a spell this admission.    - continue home cenobamate 200mg BID  - continue home keppra 1000mg BID  - check CBC, CMP, UDS, EKG, cenobamate level, keppra level  - continuous vEEG   - PRN IV ativan for GTC >5 minutes, notify Epilepsy on call if administering  - seizure precautions, suction and oxygen at bedside  - fall precautions, side rail padding in place  - Visi monitoring with continuous pulse oximetry   - telemetry

## 2025-02-17 NOTE — PLAN OF CARE
Problem: Adult Inpatient Plan of Care  Goal: Plan of Care Review  2/17/2025 1750 by Aleta Heredia RN  Outcome: Progressing  2/17/2025 1750 by Aleta Heredia RN  Outcome: Progressing  Goal: Patient-Specific Goal (Individualized)  2/17/2025 1750 by Aleta Heredia RN  Outcome: Progressing  2/17/2025 1750 by Aleta Heredia RN  Outcome: Progressing  Goal: Absence of Hospital-Acquired Illness or Injury  2/17/2025 1750 by Aleta Heredia RN  Outcome: Progressing  2/17/2025 1750 by Aleta Heredia RN  Outcome: Progressing  Goal: Optimal Comfort and Wellbeing  2/17/2025 1750 by Aleta Heredia RN  Outcome: Progressing  2/17/2025 1750 by Aleta Heredia RN  Outcome: Progressing  Goal: Readiness for Transition of Care  2/17/2025 1750 by Aleta Heredia RN  Outcome: Progressing  2/17/2025 1750 by Aleta Heredia RN  Outcome: Progressing     Problem: Bariatric Environmental Safety  Goal: Safety Maintained with Care  2/17/2025 1750 by Aleta Heredia RN  Outcome: Progressing  2/17/2025 1750 by Aleta Heredia RN  Outcome: Progressing     Problem: Fall Injury Risk  Goal: Absence of Fall and Fall-Related Injury  Outcome: Progressing     Problem: Seizure, Active Management  Goal: Absence of Seizure/Seizure-Related Injury  Outcome: Progressing     POC reviewed and updated with the patient/pt's mother. Questions regarding POC were encouraged and addressed with the patient.  VSS, see flow-sheets. Tele and continuous pulse ox maintained per provider's order. Continuous EEG in place. No acute events noted during shift. Patient is AO X 5 at this time.  Seizure, fall, and safety precautions maintained, no signs of injury noted during shift.  Upon exiting room, patient's bed locked in low position, side rails up x 4, bed alarm on, with call light within reach. Instructed patient/ pt's mother to call staff for assistance, verbalized understanding.  No acute signs  of distress noted at this time. POC ongoing.

## 2025-02-17 NOTE — PROGRESS NOTES
EEG Hook up  No sign of skin breakdown noticed  Head lightly wrapped up with a gauze    Skin Integrity: Normal     Horacio Romero   02/17/2025 12:09 PM

## 2025-02-17 NOTE — CONSULTS
VASCULAR ACCESS NOTE       Bed:904/904 A    20G x 1.75IN PIV placed in Left Forearm by CHOCO using Ultrasound Guidance.    Indication: PVA  Attempts: 1    Bartolome Hurst RN

## 2025-02-17 NOTE — SUBJECTIVE & OBJECTIVE
Past Medical History:   Diagnosis Date    Headache(784.0)     Hyperlipidemia     Hypertension        Past Surgical History:   Procedure Laterality Date    cardiac monitor  01/31/2018    COLONOSCOPY N/A 11/13/2024    Procedure: COLONOSCOPY;  Surgeon: Mariano Moody MD;  Location: [facility] ENDO;  Service: Endoscopy;  Laterality: N/A;    ENDOSCOPIC ULTRASOUND OF UPPER GASTROINTESTINAL TRACT Left 11/13/2024    Procedure: ULTRASOUND, UPPER GI TRACT, ENDOSCOPIC;  Surgeon: Mariano Moody MD;  Location: [facility] ENDO;  Service: Endoscopy;  Laterality: Left;    ESOPHAGOGASTRODUODENOSCOPY N/A 11/13/2024    Procedure: EGD (ESOPHAGOGASTRODUODENOSCOPY);  Surgeon: Mariano Moody MD;  Location: [facility] ENDO;  Service: Endoscopy;  Laterality: N/A;       Review of patient's allergies indicates:  No Known Allergies    No current facility-administered medications on file prior to encounter.     Current Outpatient Medications on File Prior to Encounter   Medication Sig    apixaban (ELIQUIS) 5 mg Tab Take 5 mg by mouth 2 (two) times daily.    atorvastatin (LIPITOR) 10 MG tablet Take 10 mg by mouth once daily.    carBAMazepine (CARBATROL) 200 MG CM12 TK 2 CS PO Q 12 H    carbamazepine (CARBATROL) 300 MG 12 hr capsule Take 400 mg by mouth 2 (two) times daily.     cenobamate (XCOPRI) 200 mg Tab Take 200 mg by mouth 2 (two) times a day.    ergocalciferol (ERGOCALCIFEROL) 50,000 unit Cap Take 50,000 Units by mouth every 7 days.    levETIRAcetam (KEPPRA) 1000 MG tablet TK 1 AND 1/2 TS PO BID    lisinopril (PRINIVIL,ZESTRIL) 40 MG tablet Take 40 mg by mouth once daily.    mupirocin calcium 2% (BACTROBAN) 2 % cream Apply topically 3 (three) times daily.    paroxetine (PAXIL) 40 MG tablet Take 40 mg by mouth every morning.    sotalol (BETAPACE) 120 MG Tab Take 80 mg by mouth every 12 (twelve) hours.     Continuous Infusions:    Family History       Problem Relation (Age of Onset)    Cancer Father    Heart disease Father, Maternal  Grandmother, Maternal Grandfather, Paternal Grandmother, Paternal Grandfather          Tobacco Use    Smoking status: Former     Current packs/day: 0.00     Types: Cigarettes     Quit date: 2010     Years since quittin.1    Smokeless tobacco: Never   Substance and Sexual Activity    Alcohol use: No    Drug use: No    Sexual activity: Not on file     Review of Systems   Constitutional:  Negative for chills and fever.   HENT:  Negative for trouble swallowing.    Eyes:  Negative for visual disturbance.   Respiratory:  Negative for cough and shortness of breath.    Cardiovascular:  Negative for chest pain.   Gastrointestinal:  Negative for nausea and vomiting.   Neurological:  Positive for seizures. Negative for dizziness, tremors, syncope, facial asymmetry, speech difficulty, weakness, light-headedness, numbness and headaches.     Objective:     Vital Signs (Most Recent):  Temp: 98.6 °F (37 °C) (25 1530)  Pulse: 67 (25 1530)  Resp: 18 (25 1152)  BP: 106/67 (25 1530)  SpO2: 95 % (25 1530) Vital Signs (24h Range):  Temp:  [98 °F (36.7 °C)-98.6 °F (37 °C)] 98.6 °F (37 °C)  Pulse:  [67-78] 67  Resp:  [16-18] 18  SpO2:  [94 %-95 %] 95 %  BP: (102-130)/(62-79) 106/67     Weight: (!) 174.1 kg (383 lb 14.4 oz)  Body mass index is 46.73 kg/m².     Physical Exam  Vitals and nursing note reviewed.   Constitutional:       General: He is not in acute distress.     Appearance: Normal appearance. He is obese.   HENT:      Head: Normocephalic and atraumatic.      Comments: EEG in place     Mouth/Throat:      Mouth: Mucous membranes are moist.      Pharynx: Oropharynx is clear.   Eyes:      General: No scleral icterus.     Extraocular Movements: Extraocular movements intact and EOM normal.      Pupils: Pupils are equal, round, and reactive to light.   Cardiovascular:      Rate and Rhythm: Normal rate and regular rhythm.      Pulses: Normal pulses.   Pulmonary:      Effort: Pulmonary effort is  normal. No respiratory distress.   Abdominal:      General: Abdomen is flat. There is no distension.      Palpations: Abdomen is soft.   Musculoskeletal:      Right lower leg: No edema.      Left lower leg: No edema.   Skin:     General: Skin is warm and dry.      Capillary Refill: Capillary refill takes less than 2 seconds.   Neurological:      Mental Status: He is alert and oriented to person, place, and time.      Motor: Motor strength is normal.     Coordination: Finger-Nose-Finger Test and Heel to Shin Test normal.      Deep Tendon Reflexes:      Reflex Scores:       Tricep reflexes are 2+ on the right side and 2+ on the left side.       Bicep reflexes are 2+ on the right side and 2+ on the left side.       Brachioradialis reflexes are 2+ on the right side and 2+ on the left side.       Patellar reflexes are 2+ on the right side and 2+ on the left side.       Achilles reflexes are 2+ on the right side and 2+ on the left side.  Psychiatric:         Speech: Speech normal.            NEUROLOGICAL EXAMINATION:     MENTAL STATUS   Oriented to person, place, and time.   Attention: normal. Concentration: normal.   Speech: speech is normal   Level of consciousness: alert    CRANIAL NERVES     CN II   Visual fields full to confrontation.     CN III, IV, VI   Pupils are equal, round, and reactive to light.  Extraocular motions are normal.   Nystagmus: none     CN V   Facial sensation intact.     CN VII   Facial expression full, symmetric.     CN VIII   CN VIII normal.     CN IX, X   CN IX normal.     CN XI   CN XI normal.     CN XII   CN XII normal.     MOTOR EXAM   Muscle bulk: normal  Overall muscle tone: normal    Strength   Strength 5/5 throughout.     REFLEXES     Reflexes   Right brachioradialis: 2+  Left brachioradialis: 2+  Right biceps: 2+  Left biceps: 2+  Right triceps: 2+  Left triceps: 2+  Right patellar: 2+  Left patellar: 2+  Right achilles: 2+  Left achilles: 2+  Right plantar: normal  Left plantar:  normal  Right Gong: absent  Left Gong: absent  Right ankle clonus: absent  Left ankle clonus: absent    SENSORY EXAM   Light touch normal.        Temperature intact     GAIT AND COORDINATION      Coordination   Finger to nose coordination: normal  Heel to shin coordination: normal      Significant Labs: CBC:   Recent Labs   Lab 02/17/25  1125   WBC 3.52*   HGB 14.7   HCT 44.6   PLT 81*     CMP:   Recent Labs   Lab 02/17/25  1125   GLU 87      K 4.3      CO2 25   BUN 11   CREATININE 0.7   CALCIUM 8.7   PROT 6.9   ALBUMIN 3.4*   BILITOT 0.5   ALKPHOS 72   AST 32   ALT 25   ANIONGAP 6*     All pertinent lab results from the past 24 hours have been reviewed.    Significant Studies: I have reviewed and interpreted all pertinent imaging results/findings within the past 24 hours.

## 2025-02-17 NOTE — ASSESSMENT & PLAN NOTE
Chronic; reports compliance with home CPAP  Has CPAP available at bedside for settings  - Ordered CPAP qhs

## 2025-02-17 NOTE — NURSING
EMU NURSING INTERVIEW  Pt admitted to EMU room 904, EMU team notified.  Onset-When did your seizures start? 2010  Aura-Do you experience an aura? dizziness  Symptoms-What symptoms do you experience? staring  Triggers-Do you have any Triggers? stress  Do you bite your tongue? no  Do become incontinent of bladder or bowels? yes  Have you been told your BP or oxygen drops during an event? no    Bed locked, bed alarm on and call light in reach. Educated to call staff before ambulating. ducated to use event button when patient feels like they will have an event or when an event is suspected. Pt and family verbalize understanding.

## 2025-02-17 NOTE — ASSESSMENT & PLAN NOTE
Chronic. S/p loop recorder placement. No history of CVA.  - continue home eliquis 5mg BID  - continue home sotalol 80mg BID  - cardiac telemetry in place

## 2025-02-17 NOTE — ASSESSMENT & PLAN NOTE
Chronic, stable  - follows with Hem/Onc and GI outpatient with upcoming plans for bone marrow biopsy per patient
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
## 2025-02-18 PROCEDURE — 95720 EEG PHY/QHP EA INCR W/VEEG: CPT | Mod: ,,, | Performed by: PSYCHIATRY & NEUROLOGY

## 2025-02-18 PROCEDURE — 27000221 HC OXYGEN, UP TO 24 HOURS

## 2025-02-18 PROCEDURE — 99900035 HC TECH TIME PER 15 MIN (STAT)

## 2025-02-18 PROCEDURE — 95714 VEEG EA 12-26 HR UNMNTR: CPT

## 2025-02-18 PROCEDURE — 25000003 PHARM REV CODE 250

## 2025-02-18 PROCEDURE — A4216 STERILE WATER/SALINE, 10 ML: HCPCS

## 2025-02-18 PROCEDURE — 94761 N-INVAS EAR/PLS OXIMETRY MLT: CPT

## 2025-02-18 PROCEDURE — 25000003 PHARM REV CODE 250: Performed by: PSYCHIATRY & NEUROLOGY

## 2025-02-18 PROCEDURE — 99499 UNLISTED E&M SERVICE: CPT | Mod: ,,, | Performed by: PHYSICIAN ASSISTANT

## 2025-02-18 PROCEDURE — 11000001 HC ACUTE MED/SURG PRIVATE ROOM

## 2025-02-18 PROCEDURE — 94660 CPAP INITIATION&MGMT: CPT

## 2025-02-18 PROCEDURE — 99233 SBSQ HOSP IP/OBS HIGH 50: CPT | Mod: FS,,, | Performed by: PSYCHIATRY & NEUROLOGY

## 2025-02-18 RX ADMIN — SOTALOL HYDROCHLORIDE 80 MG: 80 TABLET ORAL at 08:02

## 2025-02-18 RX ADMIN — APIXABAN 5 MG: 5 TABLET, FILM COATED ORAL at 08:02

## 2025-02-18 RX ADMIN — LEVETIRACETAM 1000 MG: 500 TABLET, FILM COATED ORAL at 08:02

## 2025-02-18 RX ADMIN — SODIUM CHLORIDE, PRESERVATIVE FREE 10 ML: 5 INJECTION INTRAVENOUS at 08:02

## 2025-02-18 RX ADMIN — ATORVASTATIN CALCIUM 10 MG: 10 TABLET, FILM COATED ORAL at 08:02

## 2025-02-18 RX ADMIN — LISINOPRIL 40 MG: 20 TABLET ORAL at 08:02

## 2025-02-18 RX ADMIN — CENOBAMATE 200 MG: 100 TABLET, FILM COATED ORAL at 09:02

## 2025-02-18 RX ADMIN — CENOBAMATE 200 MG: 100 TABLET, FILM COATED ORAL at 08:02

## 2025-02-18 NOTE — SUBJECTIVE & OBJECTIVE
Interval History: NAEON. This AM, patient resting comfortably in bed with mother at bedside. Patient reports poor sleep overnight and fatigue today. No other complaints. No typical events. Discussed plan of care, including stopping Levetiracetam. Answered all questions at bedside. Of note, patient refused CPAP overnight. Discussed with patient importance of CPAP compliance; patient voiced understanding.    Current Medications[1]  Continuous Infusions:    Review of Systems  Objective:     Vital Signs (Most Recent):  Temp: 98 °F (36.7 °C) (02/18/25 1136)  Pulse: 62 (02/18/25 1245)  Resp: 18 (02/18/25 1136)  BP: 100/68 (02/18/25 1136)  SpO2: (!) 94 % (02/18/25 1136) Vital Signs (24h Range):  Temp:  [97.5 °F (36.4 °C)-98.6 °F (37 °C)] 98 °F (36.7 °C)  Pulse:  [62-90] 62  Resp:  [18-19] 18  SpO2:  [88 %-98 %] 94 %  BP: (100-127)/(63-77) 100/68     Weight: (!) 174.1 kg (383 lb 14.4 oz)  Body mass index is 46.73 kg/m².     Physical Exam  Vitals reviewed.   Constitutional:       General: He is not in acute distress.     Appearance: He is not diaphoretic.   HENT:      Head: Normocephalic and atraumatic.      Comments: EEG in place  Eyes:      General: No scleral icterus.        Right eye: No discharge.         Left eye: No discharge.      Extraocular Movements: Extraocular movements intact and EOM normal.      Conjunctiva/sclera: Conjunctivae normal.      Pupils: Pupils are equal, round, and reactive to light.   Cardiovascular:      Rate and Rhythm: Normal rate.   Pulmonary:      Effort: Pulmonary effort is normal. No respiratory distress.   Musculoskeletal:         General: No swelling, tenderness or deformity.   Skin:     General: Skin is warm and dry.   Neurological:      General: No focal deficit present.      Mental Status: He is alert and oriented to person, place, and time. Mental status is at baseline.   Psychiatric:         Speech: Speech normal.         Behavior: Behavior is cooperative.            NEUROLOGICAL  EXAMINATION:     MENTAL STATUS   Oriented to person, place, and time.   Attention: normal. Concentration: normal.   Speech: speech is normal   Level of consciousness: alert    CRANIAL NERVES     CN II   Visual fields full to confrontation.     CN III, IV, VI   Pupils are equal, round, and reactive to light.  Extraocular motions are normal.     CN V   Right corneal reflex: normal  Left corneal reflex: normal    CN VII   Facial expression full, symmetric.     CN VIII   Hearing: intact    CN XI   CN XI normal.     CN XII   CN XII normal.       Significant Labs: All pertinent lab results from the past 24 hours have been reviewed.    Significant Studies: I have reviewed all pertinent imaging results/findings within the past 24 hours.       [1]   Current Facility-Administered Medications   Medication Dose Route Frequency Provider Last Rate Last Admin    acetaminophen tablet 650 mg  650 mg Oral Q4H PRN Brando Sequeira MD        apixaban tablet 5 mg  5 mg Oral BID Brando Sequeira MD   5 mg at 02/18/25 0834    atorvastatin tablet 10 mg  10 mg Oral QHS Brando Sequeira MD   10 mg at 02/17/25 2031    cenobamate Tab 200 mg  200 mg Oral BID Jose Mcleod MD   200 mg at 02/18/25 0932    docusate sodium capsule 100 mg  100 mg Oral Daily PRN Brando Sequeira MD        lisinopriL tablet 40 mg  40 mg Oral Daily Brando Sequeira MD   40 mg at 02/18/25 0834    LORazepam injection 2 mg  2 mg Intravenous Q10 Min PRN Brando Sequeira MD        sodium chloride 0.9% flush 10 mL  10 mL Intravenous PRN Brando Sequeira MD   10 mL at 02/17/25 2031    sotaloL tablet 80 mg  80 mg Oral Q12H Brando Sequeira MD   80 mg at 02/18/25 0834

## 2025-02-18 NOTE — PLAN OF CARE
Problem: Adult Inpatient Plan of Care  Goal: Plan of Care Review  Outcome: Progressing  Goal: Patient-Specific Goal (Individualized)  Outcome: Progressing  Goal: Absence of Hospital-Acquired Illness or Injury  Outcome: Progressing  Goal: Optimal Comfort and Wellbeing  Outcome: Progressing  Goal: Readiness for Transition of Care  Outcome: Progressing     Problem: Bariatric Environmental Safety  Goal: Safety Maintained with Care  Outcome: Progressing     Problem: Fall Injury Risk  Goal: Absence of Fall and Fall-Related Injury  Outcome: Progressing     Problem: Seizure, Active Management  Goal: Absence of Seizure/Seizure-Related Injury  Outcome: Progressing           POC reviewed and updated with the patient/mother. Questions regarding POC were encouraged and addressed with the patient.  VSS, see flow-sheets. Tele and continuous pulse ox maintained per provider's order. Continuous EEG in place. NAEON. Patient is AO X 4 at this time.Seizure, fall, and safety precautions maintained, no signs of injury noted during shift.  Upon exiting room, patient's bed locked in low position, side rails up x 3, bed alarm refused, with call light within reach. Instructed patient/ mother to call staff for assistance, verbalized understanding.  No acute signs of distress noted at this time.

## 2025-02-18 NOTE — PLAN OF CARE
Problem: Adult Inpatient Plan of Care  Goal: Plan of Care Review  Outcome: Progressing  Goal: Patient-Specific Goal (Individualized)  Outcome: Progressing  Goal: Absence of Hospital-Acquired Illness or Injury  Outcome: Progressing  Goal: Optimal Comfort and Wellbeing  Outcome: Progressing  Goal: Readiness for Transition of Care  Outcome: Progressing     Problem: Bariatric Environmental Safety  Goal: Safety Maintained with Care  Outcome: Progressing     Problem: Fall Injury Risk  Goal: Absence of Fall and Fall-Related Injury  Outcome: Progressing     Problem: Seizure, Active Management  Goal: Absence of Seizure/Seizure-Related Injury  Outcome: Progressing     POC reviewed and updated with the patient/pt' mother. Questions regarding POC were encouraged and addressed with the patient.  VSS, see flow-sheets. Tele and continuous pulse ox maintained per provider's order. Continuous EEG in place. No acute events noted during shift. Patient is AO X 4 at this time.  Seizure, fall, and safety precautions maintained, no signs of injury noted during shift.  Upon exiting room, patient's bed locked in low position, side rails up x 4, bed alarm on, with call light within reach. Instructed patient/ pt's mother to call staff for assistance, verbalized understanding.  No acute signs of distress noted at this time. POC ongoing.

## 2025-02-18 NOTE — ASSESSMENT & PLAN NOTE
44M PMHx of HTN, HLD, atrial fibrillation on Eliquis, LISA on CPAP qhs, fatty liver, depression with anxiety, and seizure disorder currently maintained on Cenobamate 200 mg BID and Levetiracetam 1g BID referred to EMU by Dr. Marte for event capture and characterization of 1) staring episodes occurring multiple times a week and 2) generalized convulsions associated with tongue biting and urinary incontinence last >2 years ago. Per chart review, previous concerns for NEE.     MRI 1/16/21 ([facility]): normal. EEG 7/7/2020 ([facility]): PDR 9-10 Hz, mild left temporal slowing, interictal epileptiform activity from left hemisphere. EEG 3/8/21 ([facility]): nonspecific dysfunction in the left temporal lobe. 72-hour ambulatory ([facility]): normal, no events.     - Continuous vEEG   - Held home Levetiracetam 2/18; continue Cenobamate 200 mg BID  - AED levels pending  - Activation procedures per protocol- medication adjustments as above, HV/PS  - IV Ativan PRN for GTC greater than 5 min - please call epilepsy on call   - Seizure precautions, suction and oxygen at bedside  - Fall precautions, side rail padding in place  - Visi monitoring with continuous pulse oximetry   - Telemetry

## 2025-02-18 NOTE — ASSESSMENT & PLAN NOTE
- Chronic and stable  - Follows with Hem/Onc and GI outpatient with upcoming plans for bone marrow biopsy per patient

## 2025-02-18 NOTE — ASSESSMENT & PLAN NOTE
- Chronic  - Ordered CPAP qhs  - Of note, patient refused overnight; discussed importance of CPAP compliance and patient voiced understanding

## 2025-02-18 NOTE — PROGRESS NOTES
[facility] ([facility])  Neurology-Epilepsy  Progress Note    Patient Name: Dustin Crandall Jr.  MRN: [id number]  Admission Date: 2/17/2025  Hospital Length of Stay: 1 days  Code Status: Full Code   Attending Provider: Jose Mcleod MD  Primary Care Physician: Neli George FNP   Principal Problem:Seizures    Subjective:     Hospital Course:   2/17>2/18: Admit to EMU. No typical events. Stop Levetiracetam. Proceed for event capture.    See EEG reports for details.    Interval History: NAEON. This AM, patient resting comfortably in bed with mother at bedside. Patient reports poor sleep overnight and fatigue today. No other complaints. No typical events. Discussed plan of care, including stopping Levetiracetam. Answered all questions at bedside. Of note, patient refused CPAP overnight. Discussed with patient importance of CPAP compliance; patient voiced understanding.    Current Medications[1]  Continuous Infusions:    Review of Systems  Objective:     Vital Signs (Most Recent):  Temp: 98 °F (36.7 °C) (02/18/25 1136)  Pulse: 62 (02/18/25 1245)  Resp: 18 (02/18/25 1136)  BP: 100/68 (02/18/25 1136)  SpO2: (!) 94 % (02/18/25 1136) Vital Signs (24h Range):  Temp:  [97.5 °F (36.4 °C)-98.6 °F (37 °C)] 98 °F (36.7 °C)  Pulse:  [62-90] 62  Resp:  [18-19] 18  SpO2:  [88 %-98 %] 94 %  BP: (100-127)/(63-77) 100/68     Weight: (!) 174.1 kg (383 lb 14.4 oz)  Body mass index is 46.73 kg/m².     Physical Exam  Vitals reviewed.   Constitutional:       General: He is not in acute distress.     Appearance: He is not diaphoretic.   HENT:      Head: Normocephalic and atraumatic.      Comments: EEG in place  Eyes:      General: No scleral icterus.        Right eye: No discharge.         Left eye: No discharge.      Extraocular Movements: Extraocular movements intact and EOM normal.      Conjunctiva/sclera: Conjunctivae normal.      Pupils: Pupils are equal, round, and reactive to light.   Cardiovascular:      Rate and Rhythm:  Normal rate.   Pulmonary:      Effort: Pulmonary effort is normal. No respiratory distress.   Musculoskeletal:         General: No swelling, tenderness or deformity.   Skin:     General: Skin is warm and dry.   Neurological:      General: No focal deficit present.      Mental Status: He is alert and oriented to person, place, and time. Mental status is at baseline.   Psychiatric:         Speech: Speech normal.         Behavior: Behavior is cooperative.            NEUROLOGICAL EXAMINATION:     MENTAL STATUS   Oriented to person, place, and time.   Attention: normal. Concentration: normal.   Speech: speech is normal   Level of consciousness: alert    CRANIAL NERVES     CN II   Visual fields full to confrontation.     CN III, IV, VI   Pupils are equal, round, and reactive to light.  Extraocular motions are normal.     CN V   Right corneal reflex: normal  Left corneal reflex: normal    CN VII   Facial expression full, symmetric.     CN VIII   Hearing: intact    CN XI   CN XI normal.     CN XII   CN XII normal.       Significant Labs: All pertinent lab results from the past 24 hours have been reviewed.    Significant Studies: I have reviewed all pertinent imaging results/findings within the past 24 hours.    Assessment and Plan:     * Seizures  44M PMHx of HTN, HLD, atrial fibrillation on Eliquis, LISA on CPAP qhs, fatty liver, depression with anxiety, and seizure disorder currently maintained on Cenobamate 200 mg BID and Levetiracetam 1g BID referred to EMU by Dr. Marte for event capture and characterization of 1) staring episodes occurring multiple times a week and 2) generalized convulsions associated with tongue biting and urinary incontinence last >2 years ago. Per chart review, previous concerns for NEE.     MRI 1/16/21 ([facility]): normal. EEG 7/7/2020 ([facility]): PDR 9-10 Hz, mild left temporal slowing, interictal epileptiform activity from left hemisphere. EEG 3/8/21 ([facility]): nonspecific dysfunction in  the left temporal lobe. 72-hour ambulatory ([facility]): normal, no events.     - Continuous vEEG   - Held home Levetiracetam 2/18; continue Cenobamate 200 mg BID  - AED levels pending  - Activation procedures per protocol- medication adjustments as above, HV/PS  - IV Ativan PRN for GTC greater than 5 min - please call epilepsy on call   - Seizure precautions, suction and oxygen at bedside  - Fall precautions, side rail padding in place  - Visi monitoring with continuous pulse oximetry   - Telemetry    LISA (obstructive sleep apnea)  - Chronic  - Ordered CPAP qhs  - Of note, patient refused overnight; discussed importance of CPAP compliance and patient voiced understanding    Atrial fibrillation  - Chronic, s/p loop recorder placement  - Continue home Eliquis 5 mg BID and Sotalol 80 mg BID  - Telemetry    HLD (hyperlipidemia)  - Chronic  - Continue home Atorvastatin 10 mg qhs    HTN (hypertension)  - Chronic, controlled  - Continue home Lisinopril 40 mg daily  - Continue to monitor    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 Mcleod       [1]   Current Facility-Administered Medications   Medication Dose Route Frequency Provider Last Rate Last Admin    acetaminophen tablet 650 mg  650 mg Oral Q4H PRN Brando Sequeira MD        apixaban tablet 5 mg  5 mg Oral BID Brando Sequeira MD   5 mg at 02/18/25 0834    atorvastatin tablet 10 mg  10 mg Oral QHS Brando Sequeira MD   10 mg at 02/17/25 2031    cenobamate Tab 200 mg  200 mg Oral BID Jose Mcleod MD   200 mg at 02/18/25 0932    docusate sodium capsule 100 mg  100 mg Oral  Daily PRN Brando Sequeira MD        lisinopriL tablet 40 mg  40 mg Oral Daily Brando Sequeira MD   40 mg at 02/18/25 0834    LORazepam injection 2 mg  2 mg Intravenous Q10 Min PRN Brando Sequeira MD        sodium chloride 0.9% flush 10 mL  10 mL Intravenous PRN Brando Sequeira MD   10 mL at 02/17/25 2031    sotaloL tablet 80 mg  80 mg Oral Q12H Brando Sequeira MD   80 mg at 02/18/25 0834

## 2025-02-18 NOTE — ASSESSMENT & PLAN NOTE
- Chronic, s/p loop recorder placement  - Continue home Eliquis 5 mg BID and Sotalol 80 mg BID  - Telemetry

## 2025-02-18 NOTE — HOSPITAL COURSE
2/17>2/18: Admit to EMU. No typical events. Stop Levetiracetam. Proceed for event capture.  2/18>2/19: Sleep deprivation overnight. EEG with frequent left temporal sharps, no seizures. No events. Stop Cenobamate today. PB 1304 for typical staring event c/w electroclinical seizure. Continue for additional seizure capture.  2/19>2/20: EEG with frequent left temporal sharps, 2 electroclinical seizures @1305 and @1735 of non-lateralizing temporal lobe semiology and some electrographic features suggesting left onset. Continue for additional seizure capture in consideration of presurgical evaluation.  2/20>2/21: PB for typical seizure ~1750 with staring and TRISHA which progresses to right head turn and secondary generalization associated with oxygen desaturation. Plan for discharge on Sunday. 3T MRI scheduled on 3/9.  2/21>2/22: No typical seizures yesterday, however patient had brief subclinical seizure with postictal whistling/laughing. Periodic L hemisphere discharges and intermittent focal slowing continue. Will resume AEDs today with a dose of ativan, 3g keppra load, and home cenobamate. Holding eliquis tomorrow morning in preparation for patient's scheduled BMB. Plan for discharge tomorrow morning.  2/22>2/23: No events or push button activation yesterday. Received ativan, keppra load, and oral cenobamate yesterday; tolerated well without further electrographic seizures on EEG. Continued interictal activity, decreased in frequency. Discharged home in stable condition on home AED regimen. Outpatient 3T MRI scheduled. Will follow up with Dr. Marte as well as Dr. Ramirez. Has bone marrow biopsy scheduled 2/24.    See EEG reports for details.

## 2025-02-18 NOTE — RESPIRATORY THERAPY
Med team contacted about patient refusal with cpap. No respond from med team.   [Normal] : normal gait, coordination grossly intact, no focal deficits

## 2025-02-18 NOTE — PLAN OF CARE
[facility] ([facility])  Initial Discharge Assessment       Primary Care Provider: Neli George FNP    Admission Diagnosis: Seizures [R56.9]    Admission Date: 2/17/2025  Expected Discharge Date:     Transition of Care Barriers: (P) None    Payor: MEDICAID / Plan: HE[place]UE [place] / Product Type: Managed Medicaid /     Extended Emergency Contact Information  Primary Emergency Contact: Linsey Nickerson  Mobile Phone: [phone]  Relation: Mother  Preferred language: English   needed? No    Discharge Plan A: (P) Home  Discharge Plan B: (P) Home with family      [facility] [address] [address]  [address] [address]  [facility] [address]  Phone: [phone] Fax: [phone]      Initial Assessment (most recent)       Adult Discharge Assessment - 02/18/25 1328          Discharge Assessment    Assessment Type Discharge Planning Assessment (P)      Confirmed/corrected address, phone number and insurance Yes (P)      Confirmed Demographics Correct on Facesheet (P)      Source of Information patient (P)      Communicated MIYA with patient/caregiver Date not available/Unable to determine (P)      Reason For Admission epilepsy monitoring (P)      People in Home alone (P)      Do you expect to return to your current living situation? Yes (P)      Do you have help at home or someone to help you manage your care at home? Yes (P)      Who are your caregiver(s) and their phone number(s)? Mother Linsey [phone] (P)      Prior to hospitilization cognitive status: Alert/Oriented (P)      Current cognitive status: Alert/Oriented (P)      Walking or Climbing Stairs Difficulty no (P)      Dressing/Bathing Difficulty no (P)      Home Accessibility wheelchair accessible (P)      Equipment Currently Used at Home none (P)      Readmission within 30 days? No (P)      Patient currently being followed by outpatient case management? No (P)      Do you currently have  service(s) that help you manage your care at home? No (P)      Do you take prescription medications? Yes (P)      Do you have prescription coverage? Yes (P)      Coverage Medicaid [employer] (P)      Do you have any problems affording any of your prescribed medications? No (P)      Is the patient taking medications as prescribed? yes (P)      Who is going to help you get home at discharge? Mother Linsey (P)      How do you get to doctors appointments? car, drives self;family or friend will provide (P)      Are you on dialysis? No (P)      Do you take coumadin? No (P)      Discharge Plan A Home (P)      Discharge Plan B Home with family (P)      DME Needed Upon Discharge  none (P)      Discharge Plan discussed with: Patient (P)      Transition of Care Barriers None (P)         Physical Activity    On average, how many days per week do you engage in moderate to strenuous exercise (like a brisk walk)? 4 days (P)      On average, how many minutes do you engage in exercise at this level? 40 min (P)         Financial Resource Strain    How hard is it for you to pay for the very basics like food, housing, medical care, and heating? Not very hard (P)         Housing Stability    In the last 12 months, was there a time when you were not able to pay the mortgage or rent on time? No (P)      At any time in the past 12 months, were you homeless or living in a shelter (including now)? No (P)         Transportation Needs    Has the lack of transportation kept you from medical appointments, meetings, work or from getting things needed for daily living? No (P)         Food Insecurity    Within the past 12 months, you worried that your food would run out before you got the money to buy more. Never true (P)      Within the past 12 months, the food you bought just didn't last and you didn't have money to get more. Never true (P)         Stress    Do you feel stress - tense, restless, nervous, or anxious, or unable to sleep at night  because your mind is troubled all the time - these days? To some extent (P)         Social Isolation    How often do you feel lonely or isolated from those around you?  Rarely (P)         Alcohol Use    Q1: How often do you have a drink containing alcohol? Monthly or less (P)      Q2: How many drinks containing alcohol do you have on a typical day when you are drinking? 1 or 2 (P)      Q3: How often do you have six or more drinks on one occasion? Never (P)         Utilities    In the past 12 months has the electric, gas, oil, or water company threatened to shut off services in your home? No (P)         Health Literacy    How often do you need to have someone help you when you read instructions, pamphlets, or other written material from your doctor or pharmacy? Rarely (P)                  Patient admitted for Epilepsy monitoring.  Patient will discharge home with family when medically cleared.  No anticipated post acute needs.      Discharge Plan A and Plan B have been determined by review of patient's clinical status, future medical and therapeutic needs, and coverage/benefits for post-acute care in coordination with multidisciplinary team members.    Kayla Qureshi, DIANNE  [facility]  [phone]

## 2025-02-19 PROCEDURE — 95720 EEG PHY/QHP EA INCR W/VEEG: CPT | Mod: ,,, | Performed by: PSYCHIATRY & NEUROLOGY

## 2025-02-19 PROCEDURE — 94761 N-INVAS EAR/PLS OXIMETRY MLT: CPT

## 2025-02-19 PROCEDURE — 99900035 HC TECH TIME PER 15 MIN (STAT)

## 2025-02-19 PROCEDURE — 11000001 HC ACUTE MED/SURG PRIVATE ROOM

## 2025-02-19 PROCEDURE — 27100171 HC OXYGEN HIGH FLOW UP TO 24 HOURS

## 2025-02-19 PROCEDURE — 25000003 PHARM REV CODE 250

## 2025-02-19 PROCEDURE — 99233 SBSQ HOSP IP/OBS HIGH 50: CPT | Mod: FS,,, | Performed by: PSYCHIATRY & NEUROLOGY

## 2025-02-19 PROCEDURE — 95714 VEEG EA 12-26 HR UNMNTR: CPT

## 2025-02-19 PROCEDURE — A4216 STERILE WATER/SALINE, 10 ML: HCPCS

## 2025-02-19 PROCEDURE — 94660 CPAP INITIATION&MGMT: CPT

## 2025-02-19 PROCEDURE — 99499 UNLISTED E&M SERVICE: CPT | Mod: ,,, | Performed by: PHYSICIAN ASSISTANT

## 2025-02-19 RX ADMIN — SOTALOL HYDROCHLORIDE 80 MG: 80 TABLET ORAL at 08:02

## 2025-02-19 RX ADMIN — ATORVASTATIN CALCIUM 10 MG: 10 TABLET, FILM COATED ORAL at 08:02

## 2025-02-19 RX ADMIN — LISINOPRIL 40 MG: 20 TABLET ORAL at 08:02

## 2025-02-19 RX ADMIN — SODIUM CHLORIDE, PRESERVATIVE FREE 10 ML: 5 INJECTION INTRAVENOUS at 08:02

## 2025-02-19 RX ADMIN — APIXABAN 5 MG: 5 TABLET, FILM COATED ORAL at 08:02

## 2025-02-19 NOTE — PLAN OF CARE
Problem: Adult Inpatient Plan of Care  Goal: Plan of Care Review  Outcome: Progressing  Goal: Patient-Specific Goal (Individualized)  Outcome: Progressing  Goal: Absence of Hospital-Acquired Illness or Injury  Outcome: Progressing  Goal: Optimal Comfort and Wellbeing  Outcome: Progressing  Goal: Readiness for Transition of Care  Outcome: Progressing     Problem: Bariatric Environmental Safety  Goal: Safety Maintained with Care  Outcome: Progressing     Problem: Fall Injury Risk  Goal: Absence of Fall and Fall-Related Injury  Outcome: Progressing     Problem: Seizure, Active Management  Goal: Absence of Seizure/Seizure-Related Injury  Outcome: Progressing             POC reviewed and updated with the patient/mother. Questions regarding POC were encouraged and addressed with the patient.  VSS, see flow-sheets. Tele and continuous pulse ox maintained per provider's order. Continuous EEG in place. NAEON. Patient is AO X 4 at this time. Successful sleep deprivation per provider order.  Seizure, fall, and safety precautions maintained, no signs of injury noted during shift.  Upon exiting room, patient's bed locked in low position, side rails up x 3, bed alarm refused, with call light within reach. Instructed patient/ mother to call staff for assistance, verbalized understanding.  No acute signs of distress noted at this time.

## 2025-02-19 NOTE — PROCEDURES
EMU Report    DATE OF PROCEDURE:  2/17/25     EEG NUMBER:  U -1     REFERRING PHYSICIAN: Dr. Ramirez       This EEG was performed to characterize the patient's events.     ELECTROENCEPHALOGRAM REPORT     METHODOLOGY:  Electroencephalographic (EEG) recording is recorded with electrodes placed according to the International 10-20 placement system.  Thirty two (32) channels of digital signal (sampling rate of 512/sec), including T1 and T2, were simultaneously recorded from the scalp and may include EKG, EMG, and/or eye monitors.  Recording band pass was 0.1 to 512 Hz.  Digital video recording of the patient is simultaneously recorded with the EEG.  The patient is instructed to report clinical symptoms which may occur during the recording session.  EEG and video recording are stored and archived in digital format.    Activation procedures, which include photic stimulation, hyperventilation and instructing patients to perform simple tasks, are done in selected patients.   The EEG is displayed on a monitor screen and can be reviewed using different montages.  Computer assisted-analysis is employed to detect spike and electrographic seizure activity.   The entire record is submitted for computer analysis.  The entire recording is visually reviewed, and the times identified by computer analysis as being spikes or seizures are reviewed again.    Compressed spectral analysis (CSA) is also performed on the activity recorded from each individual channel.  This is displayed as a power display of frequencies from 0 to 30 Hz over time.   The CSA is reviewed looking for asymmetries in power between homologous areas of the scalp, then compared with the original EEG recording.    [employer] software was also utilized in the review of this study.  This software suite analyzes the EEG recording in multiple domains.  Coherence and rhythmicity are computed to identify EEG sections which may contain organized seizures.  Each channel  If related to morphine and/or pump trial, should be self limiting. Use OTC analgesics, push fluids, and caffeine.   undergoes analysis to detect the presence of spike and sharp waves which have special and morphological characteristics of epileptic activity.  The routine EEG recording is converted from special into frequency domain.  This is then displayed comparing homologous areas to identify areas of significant asymmetry.  Algorithm to identify non-cortically generated artifact is used to separate artifact from the EEG.       RECORDING TIMES:   Start on February 17, 2025 at hours 11 minute 41 seconds 27  End on February 18, 2025 at hours 7 minute 0 seconds 3  The total time of EEG recording for the study was 19 hours and 5 minutes     SEIZURES RECORDED:  During this recording no events were recorded     ELECTROENCEPHALOGRAM:  Interictal:  The recording was obtained with a number of standard bipolar and referential montages during wakefulness, drowsiness and sleep.  In the alert state, the posterior background rhythm was a symmetric, well-modulated 10 Hz alpha rhythm, which reacted symmetrically to eye opening.  Activation procedures were not performed.  Infrequent sharp waves were noted maximally at F7 T3. Left FP sharps were also noted.  Intermittent left frontotemporal focal delta range slowing was noted.. During drowsiness, the background rhythm waxed and waned and there were periods of slowing. During stage II sleep, symmetric V waves, K complexes and sleep spindles were noted.     Ictal:  No events recorded      EKG: The EKG channeled revealed normal sinus rhythm      FINAL SUMMARY:  ELECTROENCEPHALOGRAM:  Interictal:  This is an abnormal EEG during wakefulness, drowsiness and sleep.   Infrequent sharp waves were noted maximally at F7 T3. Left FP sharps were also noted.  Intermittent left frontotemporal focal delta range slowing was noted.     Ictal:  During this recording no events were recorded      CLINICAL SEIZURE:  Classification:  Focal epilepsy  Lateralization:  Left hemisphere  Localization:  Frontotemporal  region     CLINICAL CORRELATION:  The patient is a 44-year-old male with a history of epilepsy who was previously maintained on Xcopri and Keppra. This is an abnormal EEG during wakefulness, drowsiness and sleep.  The presence of left frontotemporal focal slowing suggestive focal neural dysfunction this region.  The presence of left temporal maximum and frontopolar sharp waves confers an increased risk of focal seizures from this region.  During this study no seizures were recorded.

## 2025-02-19 NOTE — PLAN OF CARE
SW assigned to pt's care team.   SW anticipates home with family. SW will continue to follow for d/c needs.          Rand Lopes LMSW  - [facility]

## 2025-02-19 NOTE — PROCEDURES
EEG REPORT      Dustin Crandall Jr.  [id number]  1980    DATE OF SERVICE: 2/18/2025    [facility] -2    METHODOLOGY      Extended electroencephalographic recording is made while the patient is ambulatory and continuing normal daily activities.  Electrodes are placed according to the International 10-20 placement system and included T1 and T2 electrode placement.  Twenty four (24) channels of digital signal (sampling rate of 512/sec) was simultaneously recorded from the scalp including EKG and eye monitors.  Recording band pass was 0.1 to 100 hz and all data was stored digitally on the recorder.  The patient is instructed to press an event button when clinical symptoms occur and write the symptoms into a diary. Activation procedures which include photic stimulation, hyperventilation and instructing patients to perform simple task are done in selected patients.        The EEG is displayed on a monitor screen and can be reformatted into different montages for evaluation.  The entire recoding is submitted for computer assisted analysis to detect spike and electrographic seizure activity.  The entire recording is visually reviewed and the times identified by computer analysis as being spikes or seizures are reviewed again.  Compresses spectral analysis (CSA) is also performed on the activity recorded from each individual channel.  This is displayed as a power display of frequencies from 0 to 30 Hz over time.   The CSA analysis is done and displayed continuously.  This is reviewed for asymmetries in power between homologous areas of the scalp and for presence of changes in power which canbe seen when seizures occur.  Sections of suspected abnormalities on the CSA is then compared with the original EEG recording.  .     [employer] software was also utilized in the review of this study.  This software suite analyzes the EEG recording in multiple domains.  Coherence and rhythmicity is computed to identify EEG sections which  may contain organized seizures.  Each channel undergoes analysis to detect presence of spike and sharp waves which have special and morphological characteristic of epileptic activity.  The routine EEG recording is converted from spacial into frequency domain.  This is then displayed comparing homologous areas to identify areas of significant asymmetry.  Algorithm to identify non-cortically generated artifact is used to separate eye movement, EMG and other artifact from the EEG     Recording Times  Start on 2/18/2025  Stop on 2/19/2025    A total of 23:57:29 hours of EEG was recorded.      EEG FINDINGS:  Background activity:   The background rhythm was characterized by alpha and anterior dominant beta activity with a 10Hz posterior dominant alpha rhythm at 30-70 microvolts.   Symmetry and continuity: there is near continuous focal slowing in the left temporal region     Sleep:   Normal sleep transients including sleep spindles, K complexes, vertex waves and POSTS were seen.    Activation procedures:   NA    Abnormal activity:   Frequent sharp waves at F7    IMPRESSION:   Abnormal one day video EEG due to regional cortical or subcortical dysfunction and seizure focus in the left anterior temporal region.  No electrographic seizures.      Dixon Ramirez MD  Neurology-Epilepsy.  [facility].

## 2025-02-19 NOTE — PLAN OF CARE
Problem: Adult Inpatient Plan of Care  Goal: Plan of Care Review  Outcome: Progressing  Goal: Patient-Specific Goal (Individualized)  Outcome: Progressing  Goal: Absence of Hospital-Acquired Illness or Injury  Outcome: Progressing  Goal: Optimal Comfort and Wellbeing  Outcome: Progressing  Goal: Readiness for Transition of Care  Outcome: Progressing     Problem: Bariatric Environmental Safety  Goal: Safety Maintained with Care  Outcome: Progressing     Problem: Fall Injury Risk  Goal: Absence of Fall and Fall-Related Injury  Outcome: Progressing     Problem: Seizure, Active Management  Goal: Absence of Seizure/Seizure-Related Injury  Outcome: Progressing     POC reviewed and updated with the patient/pt's mother. Questions regarding POC were encouraged and addressed with the patient.  VSS, see flow-sheets. Tele and continuous pulse ox maintained per provider's order. Continuous EEG in place. One event noted during shift. Patient is AO X 4 at this time.  Seizure, fall, and safety precautions maintained, no signs of injury noted during shift.  Upon exiting room, patient's bed locked in low position, side rails up x 4, bed alarm on, with call light within reach. Instructed patient/ pt's mother to call staff for assistance, verbalized understanding.  No acute signs of distress noted at this time. POC ongoing.

## 2025-02-19 NOTE — NURSING
Pt's mother pressed EEG button. Upon entering the room the pt was staring off, then turned head to the right, was smacking his lips. This event last approx 1 minute. Dr. Sequeira notified. No new orders at this time. Safety precautions maintained. POC ongoing.

## 2025-02-19 NOTE — PROGRESS NOTES
[facility] ([facility])  Neurology-Epilepsy  Progress Note    Patient Name: Dustin Crandall Jr.  MRN: [id number]  Admission Date: 2/17/2025  Hospital Length of Stay: 2 days  Code Status: Full Code   Attending Provider: Dixon Ramirez MD  Primary Care Physician: Neli George FNP   Principal Problem:Seizures    Subjective:     Hospital Course:   2/17>2/18: Admit to EMU. No typical events. Stop Levetiracetam. Proceed for event capture.  2/18>2/19: Sleep deprivation overnight. EEG with frequent left temporal sharps, no seizures. No events. Stop Cenobamate today. PB 1304 for typical staring event c/w electroclinical seizure with left onset. Continue for additional seizure capture.    See EEG reports for details.    Interval History: NAEON. This AM, patient sitting upright in recliner with mother at bedside. Patient reports staying awake until ~0100. No typical events. Discussed plan of care, including stopping Cenobamate. Answered all questions at bedside.    Current Medications[1]  Continuous Infusions:    Review of Systems  Objective:     Vital Signs (Most Recent):  Temp: 97.4 °F (36.3 °C) (02/19/25 0716)  Pulse: 72 (02/19/25 0716)  Resp: 18 (02/19/25 0716)  BP: 116/74 (02/19/25 0716)  SpO2: 96 % (02/19/25 0852) Vital Signs (24h Range):  Temp:  [97.4 °F (36.3 °C)-98.2 °F (36.8 °C)] 97.4 °F (36.3 °C)  Pulse:  [62-74] 72  Resp:  [18-19] 18  SpO2:  [89 %-98 %] 96 %  BP: (100-131)/(67-83) 116/74     Weight: (!) 174.1 kg (383 lb 14.4 oz)  Body mass index is 46.73 kg/m².     Physical Exam  Vitals reviewed.   Constitutional:       General: He is not in acute distress.     Appearance: He is not diaphoretic.   HENT:      Head: Normocephalic and atraumatic.      Comments: EEG in place  Eyes:      General: No scleral icterus.        Right eye: No discharge.         Left eye: No discharge.      Extraocular Movements: Extraocular movements intact and EOM normal.      Conjunctiva/sclera: Conjunctivae normal.       Pupils: Pupils are equal, round, and reactive to light.   Cardiovascular:      Rate and Rhythm: Normal rate.   Pulmonary:      Effort: Pulmonary effort is normal. No respiratory distress.   Musculoskeletal:         General: No swelling, tenderness or deformity.   Skin:     General: Skin is warm and dry.   Neurological:      General: No focal deficit present.      Mental Status: He is alert and oriented to person, place, and time. Mental status is at baseline.   Psychiatric:         Speech: Speech normal.         Behavior: Behavior is cooperative.            NEUROLOGICAL EXAMINATION:     MENTAL STATUS   Oriented to person, place, and time.   Attention: normal. Concentration: normal.   Speech: speech is normal   Level of consciousness: alert    CRANIAL NERVES     CN II   Visual fields full to confrontation.     CN III, IV, VI   Pupils are equal, round, and reactive to light.  Extraocular motions are normal.     CN V   Right corneal reflex: normal  Left corneal reflex: normal    CN VII   Facial expression full, symmetric.     CN VIII   Hearing: intact    CN XI   CN XI normal.     CN XII   CN XII normal.       Significant Labs: All pertinent lab results from the past 24 hours have been reviewed.    Significant Studies: I have reviewed all pertinent imaging results/findings within the past 24 hours.    Assessment and Plan:     * Seizures  44M PMHx of HTN, HLD, atrial fibrillation on Eliquis, LISA on CPAP qhs, fatty liver, depression with anxiety, and seizure disorder currently maintained on Cenobamate 200 mg BID and Levetiracetam 1g BID referred to EMU by Dr. Marte for event capture and characterization of 1) staring episodes occurring multiple times a week and 2) generalized convulsions associated with tongue biting and urinary incontinence last >2 years ago. Per chart review, previous concerns for NEE.     MRI 1/16/21 ([facility]): normal. EEG 7/7/2020 ([facility]): PDR 9-10 Hz, mild left temporal slowing, interictal  epileptiform activity from left hemisphere. EEG 3/8/21 ([facility]): nonspecific dysfunction in the left temporal lobe. 72-hour ambulatory ([facility]): normal, no events.     - Continuous vEEG   - Held home Levetiracetam 2/18; continue Cenobamate 200 mg BID > held 2/19  - AED levels pending  - Activation procedures per protocol- medication adjustments as above, HV/PS  - IV Ativan PRN for GTC greater than 5 min - please call epilepsy on call   - Seizure precautions, suction and oxygen at bedside  - Fall precautions, side rail padding in place  - Visi monitoring with continuous pulse oximetry   - Telemetry    LISA (obstructive sleep apnea)  - Chronic  - Ordered CPAP qhs  - Of note, patient refused overnight; discussed importance of CPAP compliance and patient voiced understanding    Atrial fibrillation  - Chronic, s/p loop recorder placement  - Continue home Eliquis 5 mg BID and Sotalol 80 mg BID  - Telemetry    HLD (hyperlipidemia)  - Chronic  - Continue home Atorvastatin 10 mg qhs    HTN (hypertension)  - Chronic, controlled  - Continue home Lisinopril 40 mg daily  - Continue to monitor    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 Ramirez       [1]   Current Facility-Administered Medications   Medication Dose Route Frequency Provider Last Rate Last Admin    acetaminophen tablet 650 mg  650 mg Oral Q4H PRN Brando Sequeira MD        apixaban tablet 5 mg  5 mg Oral BID Brando Sequeira MD   5 mg at 02/19/25 0832    atorvastatin tablet 10 mg  10 mg Oral QHS Brando Sequeira MD   10 mg at 02/18/25 2024    docusate sodium capsule  100 mg  100 mg Oral Daily PRN Brando Sequeira MD        lisinopriL tablet 40 mg  40 mg Oral Daily Brando Sequeira MD   40 mg at 02/19/25 0833    LORazepam injection 2 mg  2 mg Intravenous Q10 Min PRN Brando Sequeira MD        sodium chloride 0.9% flush 10 mL  10 mL Intravenous PRN Brando Sequeira MD   10 mL at 02/18/25 2024    sotaloL tablet 80 mg  80 mg Oral Q12H Brando Sequeira MD   80 mg at 02/19/25 0933

## 2025-02-19 NOTE — SUBJECTIVE & OBJECTIVE
Interval History: NAEON. This AM, patient sitting upright in recliner with mother at bedside. Patient reports staying awake until ~0100. No typical events. Discussed plan of care, including stopping Cenobamate. Answered all questions at bedside.    Current Medications[1]  Continuous Infusions:    Review of Systems  Objective:     Vital Signs (Most Recent):  Temp: 97.4 °F (36.3 °C) (02/19/25 0716)  Pulse: 72 (02/19/25 0716)  Resp: 18 (02/19/25 0716)  BP: 116/74 (02/19/25 0716)  SpO2: 96 % (02/19/25 0852) Vital Signs (24h Range):  Temp:  [97.4 °F (36.3 °C)-98.2 °F (36.8 °C)] 97.4 °F (36.3 °C)  Pulse:  [62-74] 72  Resp:  [18-19] 18  SpO2:  [89 %-98 %] 96 %  BP: (100-131)/(67-83) 116/74     Weight: (!) 174.1 kg (383 lb 14.4 oz)  Body mass index is 46.73 kg/m².     Physical Exam  Vitals reviewed.   Constitutional:       General: He is not in acute distress.     Appearance: He is not diaphoretic.   HENT:      Head: Normocephalic and atraumatic.      Comments: EEG in place  Eyes:      General: No scleral icterus.        Right eye: No discharge.         Left eye: No discharge.      Extraocular Movements: Extraocular movements intact and EOM normal.      Conjunctiva/sclera: Conjunctivae normal.      Pupils: Pupils are equal, round, and reactive to light.   Cardiovascular:      Rate and Rhythm: Normal rate.   Pulmonary:      Effort: Pulmonary effort is normal. No respiratory distress.   Musculoskeletal:         General: No swelling, tenderness or deformity.   Skin:     General: Skin is warm and dry.   Neurological:      General: No focal deficit present.      Mental Status: He is alert and oriented to person, place, and time. Mental status is at baseline.   Psychiatric:         Speech: Speech normal.         Behavior: Behavior is cooperative.            NEUROLOGICAL EXAMINATION:     MENTAL STATUS   Oriented to person, place, and time.   Attention: normal. Concentration: normal.   Speech: speech is normal   Level of  consciousness: alert    CRANIAL NERVES     CN II   Visual fields full to confrontation.     CN III, IV, VI   Pupils are equal, round, and reactive to light.  Extraocular motions are normal.     CN V   Right corneal reflex: normal  Left corneal reflex: normal    CN VII   Facial expression full, symmetric.     CN VIII   Hearing: intact    CN XI   CN XI normal.     CN XII   CN XII normal.       Significant Labs: All pertinent lab results from the past 24 hours have been reviewed.    Significant Studies: I have reviewed all pertinent imaging results/findings within the past 24 hours.       [1]   Current Facility-Administered Medications   Medication Dose Route Frequency Provider Last Rate Last Admin    acetaminophen tablet 650 mg  650 mg Oral Q4H PRN Brando Sequeira MD        apixaban tablet 5 mg  5 mg Oral BID Brando Sequeira MD   5 mg at 02/19/25 0832    atorvastatin tablet 10 mg  10 mg Oral QHS Brando Sequeira MD   10 mg at 02/18/25 2024    docusate sodium capsule 100 mg  100 mg Oral Daily PRN Brando Sequeira MD        lisinopriL tablet 40 mg  40 mg Oral Daily Brando Sequeira MD   40 mg at 02/19/25 0833    LORazepam injection 2 mg  2 mg Intravenous Q10 Min PRN Brando Sequeira MD        sodium chloride 0.9% flush 10 mL  10 mL Intravenous PRN Brando Sequeira MD   10 mL at 02/18/25 2024    sotaloL tablet 80 mg  80 mg Oral Q12H Brando Sequeira MD   80 mg at 02/19/25 0833

## 2025-02-19 NOTE — ASSESSMENT & PLAN NOTE
44M PMHx of HTN, HLD, atrial fibrillation on Eliquis, LISA on CPAP qhs, fatty liver, depression with anxiety, and seizure disorder currently maintained on Cenobamate 200 mg BID and Levetiracetam 1g BID referred to EMU by Dr. Marte for event capture and characterization of 1) staring episodes occurring multiple times a week and 2) generalized convulsions associated with tongue biting and urinary incontinence last >2 years ago. Per chart review, previous concerns for NEE.     MRI 1/16/21 ([facility]): normal. EEG 7/7/2020 ([facility]): PDR 9-10 Hz, mild left temporal slowing, interictal epileptiform activity from left hemisphere. EEG 3/8/21 ([facility]): nonspecific dysfunction in the left temporal lobe. 72-hour ambulatory ([facility]): normal, no events.     - Continuous vEEG   - Held home Levetiracetam 2/18; continue Cenobamate 200 mg BID > held 2/19  - AED levels pending  - Activation procedures per protocol- medication adjustments as above, HV/PS  - IV Ativan PRN for GTC greater than 5 min - please call epilepsy on call   - Seizure precautions, suction and oxygen at bedside  - Fall precautions, side rail padding in place  - Visi monitoring with continuous pulse oximetry   - Telemetry

## 2025-02-20 PROCEDURE — 99233 SBSQ HOSP IP/OBS HIGH 50: CPT | Mod: FS,,, | Performed by: PSYCHIATRY & NEUROLOGY

## 2025-02-20 PROCEDURE — 11000001 HC ACUTE MED/SURG PRIVATE ROOM

## 2025-02-20 PROCEDURE — 95714 VEEG EA 12-26 HR UNMNTR: CPT

## 2025-02-20 PROCEDURE — 95720 EEG PHY/QHP EA INCR W/VEEG: CPT | Mod: ,,, | Performed by: PSYCHIATRY & NEUROLOGY

## 2025-02-20 PROCEDURE — A4216 STERILE WATER/SALINE, 10 ML: HCPCS

## 2025-02-20 PROCEDURE — 25000003 PHARM REV CODE 250

## 2025-02-20 RX ADMIN — APIXABAN 5 MG: 5 TABLET, FILM COATED ORAL at 09:02

## 2025-02-20 RX ADMIN — SODIUM CHLORIDE, PRESERVATIVE FREE 10 ML: 5 INJECTION INTRAVENOUS at 09:02

## 2025-02-20 RX ADMIN — APIXABAN 5 MG: 5 TABLET, FILM COATED ORAL at 08:02

## 2025-02-20 RX ADMIN — LISINOPRIL 40 MG: 20 TABLET ORAL at 08:02

## 2025-02-20 RX ADMIN — SOTALOL HYDROCHLORIDE 80 MG: 80 TABLET ORAL at 08:02

## 2025-02-20 RX ADMIN — SOTALOL HYDROCHLORIDE 80 MG: 80 TABLET ORAL at 09:02

## 2025-02-20 RX ADMIN — ATORVASTATIN CALCIUM 10 MG: 10 TABLET, FILM COATED ORAL at 09:02

## 2025-02-20 NOTE — SUBJECTIVE & OBJECTIVE
Interval History: NAEON. 2 typical events yesterday afternoon c/w electroclinical seizures. This AM, patient resting in bed with mother at bedside. No clinical seizures today. Discussed plan of care and answered questions at bedside.     Current Medications[1]  Continuous Infusions:    Review of Systems  Objective:     Vital Signs (Most Recent):  Temp: 97.4 °F (36.3 °C) (02/20/25 0736)  Pulse: 67 (02/20/25 1051)  Resp: 18 (02/20/25 0736)  BP: 138/78 (02/20/25 0736)  SpO2: (!) 90 % (02/20/25 1051) Vital Signs (24h Range):  Temp:  [97.4 °F (36.3 °C)-98.3 °F (36.8 °C)] 97.4 °F (36.3 °C)  Pulse:  [63-73] 67  Resp:  [16-20] 18  SpO2:  [90 %-96 %] 90 %  BP: (101-147)/(65-78) 138/78     Weight: (!) 174.1 kg (383 lb 14.4 oz)  Body mass index is 46.73 kg/m².     Physical Exam  Vitals reviewed.   Constitutional:       General: He is not in acute distress.     Appearance: He is not diaphoretic.   HENT:      Head: Normocephalic and atraumatic.      Comments: EEG in place  Eyes:      General: No scleral icterus.        Right eye: No discharge.         Left eye: No discharge.      Extraocular Movements: Extraocular movements intact and EOM normal.      Conjunctiva/sclera: Conjunctivae normal.      Pupils: Pupils are equal, round, and reactive to light.   Cardiovascular:      Rate and Rhythm: Normal rate.   Pulmonary:      Effort: Pulmonary effort is normal. No respiratory distress.   Musculoskeletal:         General: No swelling, tenderness or deformity.   Skin:     General: Skin is warm and dry.   Neurological:      General: No focal deficit present.      Mental Status: He is alert and oriented to person, place, and time. Mental status is at baseline.   Psychiatric:         Speech: Speech normal.         Behavior: Behavior is cooperative.            NEUROLOGICAL EXAMINATION:     MENTAL STATUS   Oriented to person, place, and time.   Attention: normal. Concentration: normal.   Speech: speech is normal   Level of consciousness:  alert    CRANIAL NERVES     CN II   Visual fields full to confrontation.     CN III, IV, VI   Pupils are equal, round, and reactive to light.  Extraocular motions are normal.     CN V   Right corneal reflex: normal  Left corneal reflex: normal    CN VII   Facial expression full, symmetric.     CN VIII   Hearing: intact    CN XI   CN XI normal.     CN XII   CN XII normal.       Significant Labs: All pertinent lab results from the past 24 hours have been reviewed.    Significant Studies: I have reviewed all pertinent imaging results/findings within the past 24 hours.       [1]   Current Facility-Administered Medications   Medication Dose Route Frequency Provider Last Rate Last Admin    acetaminophen tablet 650 mg  650 mg Oral Q4H PRN Brando Sequeira MD        apixaban tablet 5 mg  5 mg Oral BID Brando Sequeira MD   5 mg at 02/20/25 0831    atorvastatin tablet 10 mg  10 mg Oral QHS Brando Sequeira MD   10 mg at 02/19/25 2039    docusate sodium capsule 100 mg  100 mg Oral Daily PRN Brando Sequeira MD        lisinopriL tablet 40 mg  40 mg Oral Daily Brando Sequeira MD   40 mg at 02/20/25 0831    LORazepam injection 2 mg  2 mg Intravenous Q10 Min PRN Brando Sequeira MD        sodium chloride 0.9% flush 10 mL  10 mL Intravenous PRN Brando Sequeira MD   10 mL at 02/19/25 2039    sotaloL tablet 80 mg  80 mg Oral Q12H Brando Sequeira MD   80 mg at 02/20/25 0831

## 2025-02-20 NOTE — PLAN OF CARE
Problem: Adult Inpatient Plan of Care  Goal: Plan of Care Review  Outcome: Progressing  Goal: Patient-Specific Goal (Individualized)  Outcome: Progressing  Goal: Absence of Hospital-Acquired Illness or Injury  Outcome: Progressing  Goal: Optimal Comfort and Wellbeing  Outcome: Progressing  Goal: Readiness for Transition of Care  Outcome: Progressing     Problem: Bariatric Environmental Safety  Goal: Safety Maintained with Care  Outcome: Progressing     Problem: Fall Injury Risk  Goal: Absence of Fall and Fall-Related Injury  Outcome: Progressing     Problem: Seizure, Active Management  Goal: Absence of Seizure/Seizure-Related Injury  Outcome: Progressing  POC reviewed and updated with the patient/pt's mother. Questions regarding POC were encouraged and addressed with the patient.  VSS, see flow-sheets. Tele and continuous pulse ox maintained per provider's order. Continuous EEG in place. No acute events noted during shift. Patient is AO X 4 at this time.  Seizure, fall, and safety precautions maintained, no signs of injury noted during shift.  Upon exiting room, patient's bed locked in low position, side rails up x 3, bed alarm on, with call light within reach. Instructed patient/ pt's mother to call staff for assistance, verbalized understanding.  No acute signs of distress noted at this time. POC ongoing.

## 2025-02-20 NOTE — PROGRESS NOTES
[facility])  Neurology-Epilepsy  Progress Note    Patient Name: Dustin Crandall Jr.  MRN: [id number]  Admission Date: 2/17/2025  Hospital Length of Stay: 3 days  Code Status: Full Code   Attending Provider: Dixon Ramirez MD  Primary Care Physician: Neli George FNP   Principal Problem:Seizures    Subjective:     Hospital Course:   2/17>2/18: Admit to EMU. No typical events. Stop Levetiracetam. Proceed for event capture.  2/18>2/19: Sleep deprivation overnight. EEG with frequent left temporal sharps, no seizures. No events. Stop Cenobamate today. PB 1304 for typical staring event c/w electroclinical seizure. Continue for additional seizure capture.  2/19>2/20: EEG with frequent left temporal sharps, 2 electroclinical seizures @1305 and @1735 of non-lateralizing temporal lobe semiology and some electrographic features suggesting left onset. Continue for additional seizure capture in consideration of presurgical evaluation.    See EEG reports for details.    Interval History: NAEON. 2 typical events yesterday afternoon c/w electroclinical seizures. This AM, patient resting in bed with mother at bedside. No clinical seizures today. Discussed plan of care and answered questions at bedside.     Current Medications[1]  Continuous Infusions:    Review of Systems  Objective:     Vital Signs (Most Recent):  Temp: 97.4 °F (36.3 °C) (02/20/25 0736)  Pulse: 67 (02/20/25 1051)  Resp: 18 (02/20/25 0736)  BP: 138/78 (02/20/25 0736)  SpO2: (!) 90 % (02/20/25 1051) Vital Signs (24h Range):  Temp:  [97.4 °F (36.3 °C)-98.3 °F (36.8 °C)] 97.4 °F (36.3 °C)  Pulse:  [63-73] 67  Resp:  [16-20] 18  SpO2:  [90 %-96 %] 90 %  BP: (101-147)/(65-78) 138/78     Weight: (!) 174.1 kg (383 lb 14.4 oz)  Body mass index is 46.73 kg/m².     Physical Exam  Vitals reviewed.   Constitutional:       General: He is not in acute distress.     Appearance: He is not diaphoretic.   HENT:      Head: Normocephalic and atraumatic.       Comments: EEG in place  Eyes:      General: No scleral icterus.        Right eye: No discharge.         Left eye: No discharge.      Extraocular Movements: Extraocular movements intact and EOM normal.      Conjunctiva/sclera: Conjunctivae normal.      Pupils: Pupils are equal, round, and reactive to light.   Cardiovascular:      Rate and Rhythm: Normal rate.   Pulmonary:      Effort: Pulmonary effort is normal. No respiratory distress.   Musculoskeletal:         General: No swelling, tenderness or deformity.   Skin:     General: Skin is warm and dry.   Neurological:      General: No focal deficit present.      Mental Status: He is alert and oriented to person, place, and time. Mental status is at baseline.   Psychiatric:         Speech: Speech normal.         Behavior: Behavior is cooperative.            NEUROLOGICAL EXAMINATION:     MENTAL STATUS   Oriented to person, place, and time.   Attention: normal. Concentration: normal.   Speech: speech is normal   Level of consciousness: alert    CRANIAL NERVES     CN II   Visual fields full to confrontation.     CN III, IV, VI   Pupils are equal, round, and reactive to light.  Extraocular motions are normal.     CN V   Right corneal reflex: normal  Left corneal reflex: normal    CN VII   Facial expression full, symmetric.     CN VIII   Hearing: intact    CN XI   CN XI normal.     CN XII   CN XII normal.       Significant Labs: All pertinent lab results from the past 24 hours have been reviewed.    Significant Studies: I have reviewed all pertinent imaging results/findings within the past 24 hours.    Assessment and Plan:     * Seizures  44M PMHx of HTN, HLD, atrial fibrillation on Eliquis, LISA on CPAP qhs, fatty liver, depression with anxiety, and seizure disorder currently maintained on Cenobamate 200 mg BID and Levetiracetam 1g BID referred to EMU by Dr. Marte for event capture and characterization of 1) staring episodes occurring multiple times a week and 2)  generalized convulsions associated with tongue biting and urinary incontinence last >2 years ago. Per chart review, previous concerns for NEE.     MRI 1/16/21 ([facility]): normal. EEG 7/7/2020 ([facility]): PDR 9-10 Hz, mild left temporal slowing, interictal epileptiform activity from left hemisphere. EEG 3/8/21 ([facility]): nonspecific dysfunction in the left temporal lobe. 72-hour ambulatory ([facility]): normal, no events.     - Continuous vEEG   - Held home Levetiracetam 2/18; continue Cenobamate 200 mg BID > held 2/19  - AED levels pending  - Activation procedures per protocol- medication adjustments as above, HV/PS  - IV Ativan PRN for GTC greater than 5 min - please call epilepsy on call   - Seizure precautions, suction and oxygen at bedside  - Fall precautions, side rail padding in place  - Visi monitoring with continuous pulse oximetry   - Telemetry    LISA (obstructive sleep apnea)  - Chronic  - Ordered CPAP qhs  - Of note, patient refused overnight; discussed importance of CPAP compliance and patient voiced understanding    Atrial fibrillation  - Chronic, s/p loop recorder placement  - Continue home Eliquis 5 mg BID and Sotalol 80 mg BID  - Telemetry    HLD (hyperlipidemia)  - Chronic  - Continue home Atorvastatin 10 mg qhs    HTN (hypertension)  - Chronic, controlled  - Continue home Lisinopril 40 mg daily  - Continue to monitor    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 Ramirez       [1]   Current Facility-Administered Medications   Medication Dose Route Frequency Provider Last Rate Last Admin     acetaminophen tablet 650 mg  650 mg Oral Q4H PRN Brando Sequeira MD        apixaban tablet 5 mg  5 mg Oral BID Brando Sequeira MD   5 mg at 02/20/25 0831    atorvastatin tablet 10 mg  10 mg Oral QHS Brando Sequeira MD   10 mg at 02/19/25 2039    docusate sodium capsule 100 mg  100 mg Oral Daily PRN Brando Sequeira MD        lisinopriL tablet 40 mg  40 mg Oral Daily Brando Sequeira MD   40 mg at 02/20/25 0831    LORazepam injection 2 mg  2 mg Intravenous Q10 Min PRN Brando Sequeira MD        sodium chloride 0.9% flush 10 mL  10 mL Intravenous PRN Brando Sequeira MD   10 mL at 02/19/25 2039    sotaloL tablet 80 mg  80 mg Oral Q12H Brando Sequeira MD   80 mg at 02/20/25 0831

## 2025-02-20 NOTE — PLAN OF CARE
Problem: Adult Inpatient Plan of Care  Goal: Plan of Care Review  Outcome: Progressing  Goal: Patient-Specific Goal (Individualized)  Outcome: Progressing  Goal: Absence of Hospital-Acquired Illness or Injury  Outcome: Progressing  Goal: Optimal Comfort and Wellbeing  Outcome: Progressing  Goal: Readiness for Transition of Care  Outcome: Progressing     Problem: Bariatric Environmental Safety  Goal: Safety Maintained with Care  Outcome: Progressing     Problem: Fall Injury Risk  Goal: Absence of Fall and Fall-Related Injury  Outcome: Progressing     Problem: Seizure, Active Management  Goal: Absence of Seizure/Seizure-Related Injury  Outcome: Progressing           POC reviewed and updated with the patient/mother. Questions regarding POC were encouraged and addressed with the patient.  VSS, see flow-sheets. Tele and continuous pulse ox maintained per provider's order. Continuous EEG in place. NAEON. Patient is AO X 4 at this time. Seizure, fall, and safety precautions maintained, no signs of injury noted during shift.  Upon exiting room, patient's bed locked in low position, side rails up x 3, bed alarm refused, with call light within reach. Instructed patient/ mother to call staff for assistance, verbalized understanding.  No acute signs of distress noted at this time.

## 2025-02-20 NOTE — PROCEDURES
EEG REPORT      Dustin Crandall Jr.  [id number]  1980    DATE OF SERVICE: 2/19/2025    [facility] -3    METHODOLOGY      Extended electroencephalographic recording is made while the patient is ambulatory and continuing normal daily activities.  Electrodes are placed according to the International 10-20 placement system and included T1 and T2 electrode placement.  Twenty four (24) channels of digital signal (sampling rate of 512/sec) was simultaneously recorded from the scalp including EKG and eye monitors.  Recording band pass was 0.1 to 100 hz and all data was stored digitally on the recorder.  The patient is instructed to press an event button when clinical symptoms occur and write the symptoms into a diary. Activation procedures which include photic stimulation, hyperventilation and instructing patients to perform simple task are done in selected patients.        The EEG is displayed on a monitor screen and can be reformatted into different montages for evaluation.  The entire recoding is submitted for computer assisted analysis to detect spike and electrographic seizure activity.  The entire recording is visually reviewed and the times identified by computer analysis as being spikes or seizures are reviewed again.  Compresses spectral analysis (CSA) is also performed on the activity recorded from each individual channel.  This is displayed as a power display of frequencies from 0 to 30 Hz over time.   The CSA analysis is done and displayed continuously.  This is reviewed for asymmetries in power between homologous areas of the scalp and for presence of changes in power which canbe seen when seizures occur.  Sections of suspected abnormalities on the CSA is then compared with the original EEG recording.  .     [employer] software was also utilized in the review of this study.  This software suite analyzes the EEG recording in multiple domains.  Coherence and rhythmicity is computed to identify EEG sections which  may contain organized seizures.  Each channel undergoes analysis to detect presence of spike and sharp waves which have special and morphological characteristic of epileptic activity.  The routine EEG recording is converted from spacial into frequency domain.  This is then displayed comparing homologous areas to identify areas of significant asymmetry.  Algorithm to identify non-cortically generated artifact is used to separate eye movement, EMG and other artifact from the EEG     Recording Times  Start on 2/19/2025  Stop on 2/20/2025    A total of 23:56:06 hours of EEG was recorded.      EEG FINDINGS:  Background activity:   The background rhythm was characterized by alpha and anterior dominant beta activity with a 10Hz posterior dominant alpha rhythm at 30-70 microvolts.   Symmetry and continuity: there is near continuous focal slowing in the left temporal region     Sleep:   Normal sleep transients including sleep spindles, K complexes, vertex waves and POSTS were seen.    Activation procedures:   NA    Abnormal activity:   Frequent sharp waves at F7    Seizure 1 at 13:05  Mother notes behavioral arrest when he is in chair looking at his phone.  She calls to him and he is not responsive to voice.  On EEG there is generalized rhythmic slowing which is better formed over the left hemisphere in the first several seconds with sharp component likewise more distinct in the left hemisphere throughout the discharge which lasts approximately one minute.    Seizure 2 at 17:35  Identical to seizure 1.     IMPRESSION:   Abnormal one day video EEG due to regional cortical or subcortical dysfunction and seizure focus in the left anterior temporal region with two seizures of non-lateralizing temporal lobe semiology and some electrographic features suggesting left onset.      Dixon Ramirez MD  Neurology-Epilepsy.  [facility].

## 2025-02-21 DIAGNOSIS — R56.9 SEIZURES: Primary | ICD-10-CM

## 2025-02-21 LAB — LEVETIRACETAM SERPL-MCNC: 14.4 UG/ML (ref 3–60)

## 2025-02-21 PROCEDURE — 95720 EEG PHY/QHP EA INCR W/VEEG: CPT | Mod: ,,, | Performed by: PSYCHIATRY & NEUROLOGY

## 2025-02-21 PROCEDURE — 99233 SBSQ HOSP IP/OBS HIGH 50: CPT | Mod: ,,, | Performed by: PHYSICIAN ASSISTANT

## 2025-02-21 PROCEDURE — 25000003 PHARM REV CODE 250

## 2025-02-21 PROCEDURE — 95714 VEEG EA 12-26 HR UNMNTR: CPT

## 2025-02-21 PROCEDURE — 11000001 HC ACUTE MED/SURG PRIVATE ROOM

## 2025-02-21 RX ADMIN — APIXABAN 5 MG: 5 TABLET, FILM COATED ORAL at 08:02

## 2025-02-21 RX ADMIN — LISINOPRIL 40 MG: 20 TABLET ORAL at 08:02

## 2025-02-21 RX ADMIN — SOTALOL HYDROCHLORIDE 80 MG: 80 TABLET ORAL at 08:02

## 2025-02-21 RX ADMIN — ATORVASTATIN CALCIUM 10 MG: 10 TABLET, FILM COATED ORAL at 08:02

## 2025-02-21 NOTE — SUBJECTIVE & OBJECTIVE
Interval History: 1 electroclinical seizure with secondary generalization yesterday evening. No further seizures. This AM, patient sitting upright in bed with mother at bedside. Patient has no complaints. Discussed plan of care, including tentative discharge on Sunday. Answered all questions at bedside.    Current Medications[1]  Continuous Infusions:    Review of Systems  Objective:     Vital Signs (Most Recent):  Temp: 98 °F (36.7 °C) (02/21/25 0729)  Pulse: 70 (02/21/25 1040)  Resp: 18 (02/21/25 0729)  BP: 116/70 (02/21/25 0814)  SpO2: (!) 94 % (02/21/25 1040) Vital Signs (24h Range):  Temp:  [97.8 °F (36.6 °C)-98.5 °F (36.9 °C)] 98 °F (36.7 °C)  Pulse:  [62-78] 70  Resp:  [17-18] 18  SpO2:  [90 %-100 %] 94 %  BP: (113-148)/(55-84) 116/70     Weight: (!) 174.1 kg (383 lb 14.4 oz)  Body mass index is 46.73 kg/m².     Physical Exam  Vitals reviewed.   Constitutional:       General: He is not in acute distress.     Appearance: He is not diaphoretic.   HENT:      Head: Normocephalic and atraumatic.      Comments: EEG in place  Eyes:      General: No scleral icterus.        Right eye: No discharge.         Left eye: No discharge.      Extraocular Movements: Extraocular movements intact and EOM normal.      Conjunctiva/sclera: Conjunctivae normal.      Pupils: Pupils are equal, round, and reactive to light.   Cardiovascular:      Rate and Rhythm: Normal rate.   Pulmonary:      Effort: Pulmonary effort is normal. No respiratory distress.   Musculoskeletal:         General: No swelling, tenderness or deformity.   Skin:     General: Skin is warm and dry.   Neurological:      General: No focal deficit present.      Mental Status: He is alert and oriented to person, place, and time. Mental status is at baseline.   Psychiatric:         Speech: Speech normal.         Behavior: Behavior is cooperative.            NEUROLOGICAL EXAMINATION:     MENTAL STATUS   Oriented to person, place, and time.   Attention: normal.  Concentration: normal.   Speech: speech is normal   Level of consciousness: alert    CRANIAL NERVES     CN II   Visual fields full to confrontation.     CN III, IV, VI   Pupils are equal, round, and reactive to light.  Extraocular motions are normal.     CN V   Right corneal reflex: normal  Left corneal reflex: normal    CN VII   Facial expression full, symmetric.     CN VIII   Hearing: intact    CN XI   CN XI normal.     CN XII   CN XII normal.       Significant Labs: All pertinent lab results from the past 24 hours have been reviewed.    Significant Studies: I have reviewed all pertinent imaging results/findings within the past 24 hours.       [1]   Current Facility-Administered Medications   Medication Dose Route Frequency Provider Last Rate Last Admin    acetaminophen tablet 650 mg  650 mg Oral Q4H PRN Brando Sequeira MD        apixaban tablet 5 mg  5 mg Oral BID Brando Sequeira MD   5 mg at 02/21/25 0814    atorvastatin tablet 10 mg  10 mg Oral QHS Brando Sequeira MD   10 mg at 02/20/25 2105    docusate sodium capsule 100 mg  100 mg Oral Daily PRN Brando Sequeira MD        lisinopriL tablet 40 mg  40 mg Oral Daily Brando Sequeira MD   40 mg at 02/21/25 0814    LORazepam injection 2 mg  2 mg Intravenous Q10 Min PRN Brando Sequeira MD        sodium chloride 0.9% flush 10 mL  10 mL Intravenous PRN Brando Sequeira MD   10 mL at 02/20/25 2105    sotaloL tablet 80 mg  80 mg Oral Q12H Brando Sequeira MD   80 mg at 02/21/25 0814

## 2025-02-21 NOTE — ASSESSMENT & PLAN NOTE
44M PMHx of HTN, HLD, atrial fibrillation on Eliquis, LISA on CPAP qhs, fatty liver, depression with anxiety, and seizure disorder currently maintained on Cenobamate 200 mg BID and Levetiracetam 1g BID referred to EMU by Dr. Marte for event capture and characterization of 1) staring episodes occurring multiple times a week and 2) generalized convulsions associated with tongue biting and urinary incontinence last >2 years ago. Per chart review, previous concerns for NEE.     MRI 1/16/21 ([facility]): normal. EEG 7/7/2020 ([facility]): PDR 9-10 Hz, mild left temporal slowing, interictal epileptiform activity from left hemisphere. EEG 3/8/21 ([facility]): nonspecific dysfunction in the left temporal lobe. 72-hour ambulatory ([facility]): normal, no events.     - Continuous vEEG   - Held home Levetiracetam 2/18; continue Cenobamate 200 mg BID > held 2/19  - LEV 14.4; CNB pending  - Activation procedures per protocol- medication adjustments as above  - IV Ativan PRN for GTC greater than 5 min - please call epilepsy on call   - Seizure precautions, suction and oxygen at bedside  - Fall precautions, side rail padding in place  - Visi monitoring with continuous pulse oximetry   - Telemetry  - Plan for discharge on Sunday  - 3T MRI scheduled for 3/9

## 2025-02-21 NOTE — PROGRESS NOTES
[facility])  Neurology-Epilepsy  Progress Note    Patient Name: Dustin Crandall Jr.  MRN: [id number]  Admission Date: 2/17/2025  Hospital Length of Stay: 4 days  Code Status: Full Code   Attending Provider: Dixon Ramirez MD  Primary Care Physician: Neli George FNP   Principal Problem:Seizures    Subjective:     Hospital Course:   2/17>2/18: Admit to EMU. No typical events. Stop Levetiracetam. Proceed for event capture.  2/18>2/19: Sleep deprivation overnight. EEG with frequent left temporal sharps, no seizures. No events. Stop Cenobamate today. PB 1304 for typical staring event c/w electroclinical seizure. Continue for additional seizure capture.  2/19>2/20: EEG with frequent left temporal sharps, 2 electroclinical seizures @1305 and @1735 of non-lateralizing temporal lobe semiology and some electrographic features suggesting left onset. Continue for additional seizure capture in consideration of presurgical evaluation.  2/20>2/21: PB for typical seizure ~1750 with staring and TRISHA which progresses to right head turn and secondary generalization associated with oxygen desaturation. Plan for discharge on Sunday. 3T MRI scheduled on 3/9.    See EEG reports for details.    Interval History: 1 electroclinical seizure with secondary generalization yesterday evening. No further seizures. This AM, patient sitting upright in bed with mother at bedside. Patient has no complaints. Discussed plan of care, including tentative discharge on Sunday. Answered all questions at bedside.    Current Medications[1]  Continuous Infusions:    Review of Systems  Objective:     Vital Signs (Most Recent):  Temp: 98 °F (36.7 °C) (02/21/25 0729)  Pulse: 70 (02/21/25 1040)  Resp: 18 (02/21/25 0729)  BP: 116/70 (02/21/25 0814)  SpO2: (!) 94 % (02/21/25 1040) Vital Signs (24h Range):  Temp:  [97.8 °F (36.6 °C)-98.5 °F (36.9 °C)] 98 °F (36.7 °C)  Pulse:  [62-78] 70  Resp:  [17-18] 18  SpO2:  [90 %-100 %] 94 %  BP:  (113-148)/(55-84) 116/70     Weight: (!) 174.1 kg (383 lb 14.4 oz)  Body mass index is 46.73 kg/m².     Physical Exam  Vitals reviewed.   Constitutional:       General: He is not in acute distress.     Appearance: He is not diaphoretic.   HENT:      Head: Normocephalic and atraumatic.      Comments: EEG in place  Eyes:      General: No scleral icterus.        Right eye: No discharge.         Left eye: No discharge.      Extraocular Movements: Extraocular movements intact and EOM normal.      Conjunctiva/sclera: Conjunctivae normal.      Pupils: Pupils are equal, round, and reactive to light.   Cardiovascular:      Rate and Rhythm: Normal rate.   Pulmonary:      Effort: Pulmonary effort is normal. No respiratory distress.   Musculoskeletal:         General: No swelling, tenderness or deformity.   Skin:     General: Skin is warm and dry.   Neurological:      General: No focal deficit present.      Mental Status: He is alert and oriented to person, place, and time. Mental status is at baseline.   Psychiatric:         Speech: Speech normal.         Behavior: Behavior is cooperative.            NEUROLOGICAL EXAMINATION:     MENTAL STATUS   Oriented to person, place, and time.   Attention: normal. Concentration: normal.   Speech: speech is normal   Level of consciousness: alert    CRANIAL NERVES     CN II   Visual fields full to confrontation.     CN III, IV, VI   Pupils are equal, round, and reactive to light.  Extraocular motions are normal.     CN V   Right corneal reflex: normal  Left corneal reflex: normal    CN VII   Facial expression full, symmetric.     CN VIII   Hearing: intact    CN XI   CN XI normal.     CN XII   CN XII normal.       Significant Labs: All pertinent lab results from the past 24 hours have been reviewed.    Significant Studies: I have reviewed all pertinent imaging results/findings within the past 24 hours.    Assessment and Plan:     * Seizures  44M PMHx of HTN, HLD, atrial fibrillation on  Eliquis, LISA on CPAP qhs, fatty liver, depression with anxiety, and seizure disorder currently maintained on Cenobamate 200 mg BID and Levetiracetam 1g BID referred to EMU by Dr. Marte for event capture and characterization of 1) staring episodes occurring multiple times a week and 2) generalized convulsions associated with tongue biting and urinary incontinence last >2 years ago. Per chart review, previous concerns for NEE.     MRI 1/16/21 ([facility]): normal. EEG 7/7/2020 ([facility]): PDR 9-10 Hz, mild left temporal slowing, interictal epileptiform activity from left hemisphere. EEG 3/8/21 ([facility]): nonspecific dysfunction in the left temporal lobe. 72-hour ambulatory ([facility]): normal, no events.     - Continuous vEEG   - Held home Levetiracetam 2/18; continue Cenobamate 200 mg BID > held 2/19  - LEV 14.4; CNB pending  - Activation procedures per protocol- medication adjustments as above  - IV Ativan PRN for GTC greater than 5 min - please call epilepsy on call   - Seizure precautions, suction and oxygen at bedside  - Fall precautions, side rail padding in place  - Visi monitoring with continuous pulse oximetry   - Telemetry  - Plan for discharge on Sunday  - 3T MRI scheduled for 3/9    LISA (obstructive sleep apnea)  - Chronic  - Ordered CPAP qhs  - Of note, patient refused overnight; discussed importance of CPAP compliance and patient voiced understanding    Atrial fibrillation  - Chronic, s/p loop recorder placement  - Continue home Eliquis 5 mg BID and Sotalol 80 mg BID  - Telemetry    HLD (hyperlipidemia)  - Chronic  - Continue home Atorvastatin 10 mg qhs    HTN (hypertension)  - Chronic, controlled  - Continue home Lisinopril 40 mg daily  - Continue to monitor    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 Ramirez       [1]   Current Facility-Administered Medications   Medication Dose Route Frequency Provider Last Rate Last Admin    acetaminophen tablet 650 mg  650 mg Oral Q4H PRN Brando Sequeira MD        apixaban tablet 5 mg  5 mg Oral BID Brando Sequeira MD   5 mg at 02/21/25 0814    atorvastatin tablet 10 mg  10 mg Oral QHS Brando Sequeira MD   10 mg at 02/20/25 2105    docusate sodium capsule 100 mg  100 mg Oral Daily PRN Brando Sequeira MD        lisinopriL tablet 40 mg  40 mg Oral Daily Brando Sequeira MD   40 mg at 02/21/25 0814    LORazepam injection 2 mg  2 mg Intravenous Q10 Min PRN Brando Sequeira MD        sodium chloride 0.9% flush 10 mL  10 mL Intravenous PRN Brando Sequeira MD   10 mL at 02/20/25 2105    sotaloL tablet 80 mg  80 mg Oral Q12H Brando Sequeira MD   80 mg at 02/21/25 0814

## 2025-02-21 NOTE — NURSING
"Pt's mother came into the hallway stating that "Dustin" is having a seizure. Upon entering the room pt was staring and unresponsive, head deviated to the right, lip smacking, O2 sats dropped to 83%, pt moving in a jerking motion. Event lasted about 3 minutes. Placed pt on 4L NC, VSS. Safety precautions in place. Dr. Sequeira notified. POC ongoing.   "

## 2025-02-21 NOTE — PROCEDURES
EEG REPORT      Dustin Crandall Jr.  [id number]  1980    DATE OF SERVICE: 2/20/2025    [facility] -4    METHODOLOGY      Extended electroencephalographic recording is made while the patient is ambulatory and continuing normal daily activities.  Electrodes are placed according to the International 10-20 placement system and included T1 and T2 electrode placement.  Twenty four (24) channels of digital signal (sampling rate of 512/sec) was simultaneously recorded from the scalp including EKG and eye monitors.  Recording band pass was 0.1 to 100 hz and all data was stored digitally on the recorder.  The patient is instructed to press an event button when clinical symptoms occur and write the symptoms into a diary. Activation procedures which include photic stimulation, hyperventilation and instructing patients to perform simple task are done in selected patients.        The EEG is displayed on a monitor screen and can be reformatted into different montages for evaluation.  The entire recoding is submitted for computer assisted analysis to detect spike and electrographic seizure activity.  The entire recording is visually reviewed and the times identified by computer analysis as being spikes or seizures are reviewed again.  Compresses spectral analysis (CSA) is also performed on the activity recorded from each individual channel.  This is displayed as a power display of frequencies from 0 to 30 Hz over time.   The CSA analysis is done and displayed continuously.  This is reviewed for asymmetries in power between homologous areas of the scalp and for presence of changes in power which canbe seen when seizures occur.  Sections of suspected abnormalities on the CSA is then compared with the original EEG recording.  .     [employer] software was also utilized in the review of this study.  This software suite analyzes the EEG recording in multiple domains.  Coherence and rhythmicity is computed to identify EEG sections which  may contain organized seizures.  Each channel undergoes analysis to detect presence of spike and sharp waves which have special and morphological characteristic of epileptic activity.  The routine EEG recording is converted from spacial into frequency domain.  This is then displayed comparing homologous areas to identify areas of significant asymmetry.  Algorithm to identify non-cortically generated artifact is used to separate eye movement, EMG and other artifact from the EEG     Recording Times  Start on 2/20/2025  Stop on 2/21/2025    A total of 23:28:42 hours of EEG was recorded.      EEG FINDINGS:  Background activity:   The background rhythm was characterized by alpha and anterior dominant beta activity with a 10Hz posterior dominant alpha rhythm at 30-70 microvolts.   Symmetry and continuity: there is near continuous focal slowing in the left temporal region     Sleep:   Normal sleep transients including sleep spindles, K complexes, vertex waves and POSTS were seen.    Activation procedures:   NA    Abnormal activity:   Frequent sharp waves at F7    Seizure 1 at 17:50  Patient is awake and seated up in bed when mother notes behavioral arrest and hits event little.   Approximately 45 seconds into the discharge there is a gradual head turn to the right and stereotyped grasp of smart phone with the left hand prior to ictal cry and generalized tonic-clonic seizure.  On EEG there is generalized rhythmic slowing which is better formed over the left hemisphere in the first several seconds with sharp component likewise more distinct in the left hemisphere.  This transitions to confluent muscle artifact consistent with secondary generalization after approximately a minute.       IMPRESSION:   Abnormal one day video EEG due to regional cortical or subcortical dysfunction and seizure focus in the left anterior temporal region with two seizures of non-lateralizing temporal lobe semiology and some electrographic features  suggesting left onset.      Dixon Ramirez MD  Neurology-Epilepsy.  [facility].

## 2025-02-21 NOTE — DISCHARGE INSTRUCTIONS
Discussed with patient and mother regarding driving laws in [place] after seizures. Patient must refrain from driving for 6 months after having a seizure or cleared by a neurologist to legally resume driving. Additionally, advised patient to avoid bathing or swimming alone, climbing ladders or standing near precipice where one could fall, operating heavy machinery and to not supervise children or engage in other activities where losing consciousness or motor control would risk harm to self or others.

## 2025-02-22 PROCEDURE — 25000003 PHARM REV CODE 250

## 2025-02-22 PROCEDURE — 63600175 PHARM REV CODE 636 W HCPCS

## 2025-02-22 PROCEDURE — 95714 VEEG EA 12-26 HR UNMNTR: CPT

## 2025-02-22 PROCEDURE — 11000001 HC ACUTE MED/SURG PRIVATE ROOM

## 2025-02-22 PROCEDURE — 27000221 HC OXYGEN, UP TO 24 HOURS

## 2025-02-22 PROCEDURE — 94660 CPAP INITIATION&MGMT: CPT

## 2025-02-22 PROCEDURE — 99900035 HC TECH TIME PER 15 MIN (STAT)

## 2025-02-22 PROCEDURE — 95720 EEG PHY/QHP EA INCR W/VEEG: CPT | Mod: ,,, | Performed by: PSYCHIATRY & NEUROLOGY

## 2025-02-22 PROCEDURE — 94761 N-INVAS EAR/PLS OXIMETRY MLT: CPT

## 2025-02-22 PROCEDURE — 99233 SBSQ HOSP IP/OBS HIGH 50: CPT | Mod: ,,, | Performed by: PSYCHIATRY & NEUROLOGY

## 2025-02-22 RX ORDER — LEVETIRACETAM 500 MG/1
1000 TABLET ORAL 2 TIMES DAILY
Status: DISCONTINUED | OUTPATIENT
Start: 2025-02-23 | End: 2025-02-23 | Stop reason: HOSPADM

## 2025-02-22 RX ADMIN — APIXABAN 5 MG: 5 TABLET, FILM COATED ORAL at 08:02

## 2025-02-22 RX ADMIN — SOTALOL HYDROCHLORIDE 80 MG: 80 TABLET ORAL at 08:02

## 2025-02-22 RX ADMIN — LEVETIRACETAM 3000 MG: 100 INJECTION, SOLUTION INTRAVENOUS at 10:02

## 2025-02-22 RX ADMIN — CENOBAMATE 200 MG: 200 TABLET, FILM COATED ORAL at 10:02

## 2025-02-22 RX ADMIN — CENOBAMATE 200 MG: 200 TABLET, FILM COATED ORAL at 08:02

## 2025-02-22 RX ADMIN — ATORVASTATIN CALCIUM 10 MG: 10 TABLET, FILM COATED ORAL at 08:02

## 2025-02-22 RX ADMIN — LISINOPRIL 40 MG: 20 TABLET ORAL at 08:02

## 2025-02-22 RX ADMIN — LORAZEPAM 2 MG: 2 INJECTION INTRAMUSCULAR; INTRAVENOUS at 10:02

## 2025-02-22 NOTE — ASSESSMENT & PLAN NOTE
- Chronic, s/p loop recorder placement  - Continue home Eliquis 5 mg BID and Sotalol 80 mg BID  - HOLDING eliquis starting morning of 2/23 in preparation for patient's scheduled bone marrow biopsy on 2/24  - Telemetry

## 2025-02-22 NOTE — SUBJECTIVE & OBJECTIVE
Interval History: 1 subclinical event without PB activation yesterday. Planning to give ativan and restart AEDs today. Holding eliquis tomorrow morning in preparation of patient's scheduled BMB. Discussed plan of care, including tentative discharge on Sunday. Answered all questions at bedside.    Current Medications[1]  Continuous Infusions:    Review of Systems  Objective:     Vital Signs (Most Recent):  Temp: 97.7 °F (36.5 °C) (02/22/25 0800)  Pulse: 73 (02/22/25 0800)  Resp: 20 (02/22/25 0800)  BP: 105/68 (02/22/25 0800)  SpO2: (!) 91 % (02/22/25 0852) Vital Signs (24h Range):  Temp:  [97.4 °F (36.3 °C)-98.3 °F (36.8 °C)] 97.7 °F (36.5 °C)  Pulse:  [70-85] 73  Resp:  [18-20] 20  SpO2:  [91 %-98 %] 91 %  BP: (105-132)/(68-74) 105/68     Weight: (!) 174.1 kg (383 lb 14.4 oz)  Body mass index is 46.73 kg/m².     Physical Exam  Vitals reviewed.   Constitutional:       General: He is not in acute distress.     Appearance: He is not diaphoretic.   HENT:      Head: Normocephalic and atraumatic.      Comments: EEG in place  Eyes:      General: No scleral icterus.        Right eye: No discharge.         Left eye: No discharge.      Extraocular Movements: Extraocular movements intact and EOM normal.      Conjunctiva/sclera: Conjunctivae normal.      Pupils: Pupils are equal, round, and reactive to light.   Cardiovascular:      Rate and Rhythm: Normal rate.   Pulmonary:      Effort: Pulmonary effort is normal. No respiratory distress.   Musculoskeletal:         General: No swelling, tenderness or deformity.   Skin:     General: Skin is warm and dry.   Neurological:      General: No focal deficit present.      Mental Status: He is alert and oriented to person, place, and time. Mental status is at baseline.   Psychiatric:         Speech: Speech normal.         Behavior: Behavior is cooperative.            NEUROLOGICAL EXAMINATION:     MENTAL STATUS   Oriented to person, place, and time.   Attention: normal. Concentration:  normal.   Speech: speech is normal   Level of consciousness: alert    CRANIAL NERVES     CN II   Visual fields full to confrontation.     CN III, IV, VI   Pupils are equal, round, and reactive to light.  Extraocular motions are normal.     CN V   Right corneal reflex: normal  Left corneal reflex: normal    CN VII   Facial expression full, symmetric.     CN VIII   Hearing: intact    CN XI   CN XI normal.     CN XII   CN XII normal.       Significant Labs: All pertinent lab results from the past 24 hours have been reviewed.    Significant Studies: I have reviewed all pertinent imaging results/findings within the past 24 hours.       [1]   Current Facility-Administered Medications   Medication Dose Route Frequency Provider Last Rate Last Admin    acetaminophen tablet 650 mg  650 mg Oral Q4H PRN Brando Sequeira MD        apixaban tablet 5 mg  5 mg Oral BID Brando Sequeira MD        atorvastatin tablet 10 mg  10 mg Oral QHS Brando Sequeira MD   10 mg at 02/21/25 2048    docusate sodium capsule 100 mg  100 mg Oral Daily PRN Brando Sequeira MD        levETIRAcetam (Keppra) 3,000 mg in 0.9% NaCl 250 mL IVPB  3,000 mg Intravenous Once Brando Sequeira MD        [START ON 2/23/2025] levETIRAcetam tablet 1,000 mg  1,000 mg Oral BID Brando Sequeira MD        lisinopriL tablet 40 mg  40 mg Oral Daily Brando Sequeira MD   40 mg at 02/22/25 0827    LORazepam injection 2 mg  2 mg Intravenous Q10 Min PRN Brando Sequeira MD        NON FORMULARY MEDICATION 200 mg  200 mg Oral BID Brando Sequeira MD        sodium chloride 0.9% flush 10 mL  10 mL Intravenous PRN Brando Sequeira MD   10 mL at 02/20/25 2105    sotaloL tablet 80 mg  80 mg Oral Q12H Brando Sequeira MD   80 mg at 02/22/25 0827

## 2025-02-22 NOTE — PLAN OF CARE
Problem: Adult Inpatient Plan of Care  Goal: Plan of Care Review  Outcome: Progressing  Goal: Optimal Comfort and Wellbeing  Outcome: Progressing     Problem: Fall Injury Risk  Goal: Absence of Fall and Fall-Related Injury  Outcome: Progressing     Problem: Seizure, Active Management  Goal: Absence of Seizure/Seizure-Related Injury  Outcome: Progressing       EEG in place; sz precautions continued. No events; family at bedside. Wctm.

## 2025-02-22 NOTE — PROGRESS NOTES
[facility] - Neurosurgery ([facility])  Neurology-Epilepsy  Progress Note    Patient Name: Dustin Crandall Jr.  MRN: [id number]  Admission Date: 2/17/2025  Hospital Length of Stay: 5 days  Code Status: Full Code   Attending Provider: Dixon Ramirez MD  Primary Care Physician: Neli George FNP   Principal Problem:Seizures    Subjective:     Hospital Course:   2/17>2/18: Admit to EMU. No typical events. Stop Levetiracetam. Proceed for event capture.  2/18>2/19: Sleep deprivation overnight. EEG with frequent left temporal sharps, no seizures. No events. Stop Cenobamate today. PB 1304 for typical staring event c/w electroclinical seizure. Continue for additional seizure capture.  2/19>2/20: EEG with frequent left temporal sharps, 2 electroclinical seizures @1305 and @1735 of non-lateralizing temporal lobe semiology and some electrographic features suggesting left onset. Continue for additional seizure capture in consideration of presurgical evaluation.  2/20>2/21: PB for typical seizure ~1750 with staring and TRISHA which progresses to right head turn and secondary generalization associated with oxygen desaturation. Plan for discharge on Sunday. 3T MRI scheduled on 3/9.  2/21>2/22: No typical seizures yesterday, however patient had brief subclinical seizure with postictal whistling/laughing. Periodic L hemisphere discharges and intermittent focal slowing continue. Will resume AEDs today with a dose of ativan, 3g keppra load, and home cenobamate. Holding eliquis tomorrow morning in preparation for patient's scheduled BMB. Plan for discharge tomorrow morning.    See EEG reports for details.    Interval History: 1 subclinical event without PB activation yesterday. Planning to give ativan and restart AEDs today. Holding eliquis tomorrow morning in preparation of patient's scheduled BMB. Discussed plan of care, including tentative discharge on Sunday. Answered all questions at bedside.    Current  Medications[1]  Continuous Infusions:    Review of Systems  Objective:     Vital Signs (Most Recent):  Temp: 97.7 °F (36.5 °C) (02/22/25 0800)  Pulse: 73 (02/22/25 0800)  Resp: 20 (02/22/25 0800)  BP: 105/68 (02/22/25 0800)  SpO2: (!) 91 % (02/22/25 0852) Vital Signs (24h Range):  Temp:  [97.4 °F (36.3 °C)-98.3 °F (36.8 °C)] 97.7 °F (36.5 °C)  Pulse:  [70-85] 73  Resp:  [18-20] 20  SpO2:  [91 %-98 %] 91 %  BP: (105-132)/(68-74) 105/68     Weight: (!) 174.1 kg (383 lb 14.4 oz)  Body mass index is 46.73 kg/m².     Physical Exam  Vitals reviewed.   Constitutional:       General: He is not in acute distress.     Appearance: He is not diaphoretic.   HENT:      Head: Normocephalic and atraumatic.      Comments: EEG in place  Eyes:      General: No scleral icterus.        Right eye: No discharge.         Left eye: No discharge.      Extraocular Movements: Extraocular movements intact and EOM normal.      Conjunctiva/sclera: Conjunctivae normal.      Pupils: Pupils are equal, round, and reactive to light.   Cardiovascular:      Rate and Rhythm: Normal rate.   Pulmonary:      Effort: Pulmonary effort is normal. No respiratory distress.   Musculoskeletal:         General: No swelling, tenderness or deformity.   Skin:     General: Skin is warm and dry.   Neurological:      General: No focal deficit present.      Mental Status: He is alert and oriented to person, place, and time. Mental status is at baseline.   Psychiatric:         Speech: Speech normal.         Behavior: Behavior is cooperative.            NEUROLOGICAL EXAMINATION:     MENTAL STATUS   Oriented to person, place, and time.   Attention: normal. Concentration: normal.   Speech: speech is normal   Level of consciousness: alert    CRANIAL NERVES     CN II   Visual fields full to confrontation.     CN III, IV, VI   Pupils are equal, round, and reactive to light.  Extraocular motions are normal.     CN V   Right corneal reflex: normal  Left corneal reflex:  normal    CN VII   Facial expression full, symmetric.     CN VIII   Hearing: intact    CN XI   CN XI normal.     CN XII   CN XII normal.       Significant Labs: All pertinent lab results from the past 24 hours have been reviewed.    Significant Studies: I have reviewed all pertinent imaging results/findings within the past 24 hours.    Assessment and Plan:     * Seizures  44M PMHx of HTN, HLD, atrial fibrillation on Eliquis, LISA on CPAP qhs, fatty liver, depression with anxiety, and seizure disorder currently maintained on Cenobamate 200 mg BID and Levetiracetam 1g BID referred to EMU by Dr. Marte for event capture and characterization of 1) staring episodes occurring multiple times a week and 2) generalized convulsions associated with tongue biting and urinary incontinence last >2 years ago. Per chart review, previous concerns for NEE.     MRI 1/16/21 ([facility]): normal. EEG 7/7/2020 ([facility]): PDR 9-10 Hz, mild left temporal slowing, interictal epileptiform activity from left hemisphere. EEG 3/8/21 ([facility]): nonspecific dysfunction in the left temporal lobe. 72-hour ambulatory ([facility]): normal, no events.     - Giving Ativan 2mg IV, 3g keppra IV load  - Resume cenobamate 200mg BID with first dose now  - Resume keppra 1g PO BID tomorrow morning  - Continuous vEEG   - Held home Levetiracetam 2/18; Cenobamate 200 mg BID > held 2/19  - LEV 14.4; CNB pending  - Activation procedures per protocol- medication adjustments as above  - IV Ativan PRN for GTC greater than 5 min - please call epilepsy on call   - Seizure precautions, suction and oxygen at bedside  - Fall precautions, side rail padding in place  - Visi monitoring with continuous pulse oximetry   - Telemetry  - Plan for discharge on Sunday  - 3T MRI scheduled for 3/9 - requested patient's device card for MRI compatability    LISA (obstructive sleep apnea)  - Chronic  - Ordered CPAP qhs  - Of note, patient refused overnight; discussed importance of  CPAP compliance and patient voiced understanding    Atrial fibrillation  - Chronic, s/p loop recorder placement  - Continue home Eliquis 5 mg BID and Sotalol 80 mg BID  - HOLDING eliquis starting morning of 2/23 in preparation for patient's scheduled bone marrow biopsy on 2/24  - Telemetry    HLD (hyperlipidemia)  - Chronic  - Continue home Atorvastatin 10 mg qhs    HTN (hypertension)  - Chronic, controlled  - Continue home Lisinopril 40 mg daily  - Continue to monitor    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, immune  - Chronic and stable  - Follows with Hem/Onc and GI outpatient with upcoming plans for bone marrow biopsy per patient        VTE Risk Mitigation (From admission, onward)           Ordered     apixaban tablet 5 mg  2 times daily         02/22/25 0945     IP VTE HIGH RISK PATIENT  Once         02/17/25 0955                    Brando Sequeira MD  Neurology-Epilepsy  [facility] - Neurosurgery ([facility])       [1]   Current Facility-Administered Medications   Medication Dose Route Frequency Provider Last Rate Last Admin    acetaminophen tablet 650 mg  650 mg Oral Q4H PRN Brando Sequeira MD        apixaban tablet 5 mg  5 mg Oral BID Brando Sequeira MD        atorvastatin tablet 10 mg  10 mg Oral QHS Brando Sequeira MD   10 mg at 02/21/25 2048    docusate sodium capsule 100 mg  100 mg Oral Daily PRN Brando eSqueira MD        levETIRAcetam (Keppra) 3,000 mg in 0.9% NaCl 250 mL IVPB  3,000 mg Intravenous Once Brando Sequeira MD        [START ON 2/23/2025] levETIRAcetam tablet 1,000 mg  1,000 mg Oral BID Brando Sequeira MD        lisinopriL tablet 40 mg  40 mg Oral Daily Brando Sequeira MD   40 mg at 02/22/25 0827    LORazepam injection 2 mg  2 mg Intravenous Q10 Min PRN Brando Sequeira MD        NON FORMULARY MEDICATION 200 mg  200 mg Oral BID Brando Sequeira MD        sodium chloride 0.9% flush 10 mL  10 mL Intravenous PRN Brando Sequeira MD   10 mL at 02/20/25 2105    sotaloL tablet 80 mg  80 mg Oral Q12H Brando Sequeira MD   80 mg at  02/22/25 0827

## 2025-02-22 NOTE — ASSESSMENT & PLAN NOTE
44M PMHx of HTN, HLD, atrial fibrillation on Eliquis, LISA on CPAP qhs, fatty liver, depression with anxiety, and seizure disorder currently maintained on Cenobamate 200 mg BID and Levetiracetam 1g BID referred to EMU by Dr. Marte for event capture and characterization of 1) staring episodes occurring multiple times a week and 2) generalized convulsions associated with tongue biting and urinary incontinence last >2 years ago. Per chart review, previous concerns for NEE.     MRI 1/16/21 ([facility]): normal. EEG 7/7/2020 ([facility]): PDR 9-10 Hz, mild left temporal slowing, interictal epileptiform activity from left hemisphere. EEG 3/8/21 ([facility]): nonspecific dysfunction in the left temporal lobe. 72-hour ambulatory ([facility]): normal, no events.     - Giving Ativan 2mg IV, 3g keppra IV load  - Resume cenobamate 200mg BID with first dose now  - Resume keppra 1g PO BID tomorrow morning  - Continuous vEEG   - Held home Levetiracetam 2/18; Cenobamate 200 mg BID > held 2/19  - LEV 14.4; CNB pending  - Activation procedures per protocol- medication adjustments as above  - IV Ativan PRN for GTC greater than 5 min - please call epilepsy on call   - Seizure precautions, suction and oxygen at bedside  - Fall precautions, side rail padding in place  - Visi monitoring with continuous pulse oximetry   - Telemetry  - Plan for discharge on Sunday  - 3T MRI scheduled for 3/9 - requested patient's device card for MRI compatability

## 2025-02-22 NOTE — PROCEDURES
EEG REPORT      Dustin Crandall Jr.  [id number]  1980    DATE OF SERVICE: 2/21/2025    [facility] -5    METHODOLOGY      Extended electroencephalographic recording is made while the patient is ambulatory and continuing normal daily activities.  Electrodes are placed according to the International 10-20 placement system and included T1 and T2 electrode placement.  Twenty four (24) channels of digital signal (sampling rate of 512/sec) was simultaneously recorded from the scalp including EKG and eye monitors.  Recording band pass was 0.1 to 100 hz and all data was stored digitally on the recorder.  The patient is instructed to press an event button when clinical symptoms occur and write the symptoms into a diary. Activation procedures which include photic stimulation, hyperventilation and instructing patients to perform simple task are done in selected patients.        The EEG is displayed on a monitor screen and can be reformatted into different montages for evaluation.  The entire recoding is submitted for computer assisted analysis to detect spike and electrographic seizure activity.  The entire recording is visually reviewed and the times identified by computer analysis as being spikes or seizures are reviewed again.  Compresses spectral analysis (CSA) is also performed on the activity recorded from each individual channel.  This is displayed as a power display of frequencies from 0 to 30 Hz over time.   The CSA analysis is done and displayed continuously.  This is reviewed for asymmetries in power between homologous areas of the scalp and for presence of changes in power which canbe seen when seizures occur.  Sections of suspected abnormalities on the CSA is then compared with the original EEG recording.  .     [employer] software was also utilized in the review of this study.  This software suite analyzes the EEG recording in multiple domains.  Coherence and rhythmicity is computed to identify EEG sections which  may contain organized seizures.  Each channel undergoes analysis to detect presence of spike and sharp waves which have special and morphological characteristic of epileptic activity.  The routine EEG recording is converted from spacial into frequency domain.  This is then displayed comparing homologous areas to identify areas of significant asymmetry.  Algorithm to identify non-cortically generated artifact is used to separate eye movement, EMG and other artifact from the EEG     Recording Times  Start on 2/21/2025  Stop on 2/22/2025    A total of 23:44:43 hours of EEG was recorded.      EEG FINDINGS:  Background activity:   The background rhythm was characterized by alpha and anterior dominant beta activity with a 10Hz posterior dominant alpha rhythm at 30-70 microvolts.   Symmetry and continuity: there is near continuous focal slowing in the left temporal region     Sleep:   Normal sleep transients including sleep spindles, K complexes, vertex waves and POSTS were seen.    Activation procedures:   NA    Abnormal activity:   Frequent sharp waves at F7 and left anterior temporal rhythmic delta (TIRDA)    Seizure 1 at 19:40  Patient is awake and seated up in bed with family speaking to him.  There is no clinical change and family does not notice or hit event little.  On EEG there is generalized rhythmic slowing which is better formed over the left hemisphere in the first several seconds with sharp component likewise more distinct in the left hemisphere.       IMPRESSION:   Abnormal one day video EEG due to regional cortical or subcortical dysfunction and seizure focus in the left anterior temporal region with one seizure of non-lateralizing temporal lobe semiology and some electrographic features suggesting left onset.      Dixon Ramirez MD  Neurology-Epilepsy.  [facility].

## 2025-02-23 VITALS
RESPIRATION RATE: 18 BRPM | HEART RATE: 72 BPM | WEIGHT: 315 LBS | HEIGHT: 76 IN | DIASTOLIC BLOOD PRESSURE: 69 MMHG | TEMPERATURE: 98 F | BODY MASS INDEX: 38.36 KG/M2 | OXYGEN SATURATION: 97 % | SYSTOLIC BLOOD PRESSURE: 112 MMHG

## 2025-02-23 PROCEDURE — 27000221 HC OXYGEN, UP TO 24 HOURS

## 2025-02-23 PROCEDURE — 94761 N-INVAS EAR/PLS OXIMETRY MLT: CPT

## 2025-02-23 PROCEDURE — 94660 CPAP INITIATION&MGMT: CPT

## 2025-02-23 PROCEDURE — 99900035 HC TECH TIME PER 15 MIN (STAT)

## 2025-02-23 PROCEDURE — 99239 HOSP IP/OBS DSCHRG MGMT >30: CPT | Mod: ,,, | Performed by: PSYCHIATRY & NEUROLOGY

## 2025-02-23 PROCEDURE — 95718 EEG PHYS/QHP 2-12 HR W/VEEG: CPT | Mod: ,,, | Performed by: PSYCHIATRY & NEUROLOGY

## 2025-02-23 PROCEDURE — 25000003 PHARM REV CODE 250

## 2025-02-23 RX ADMIN — LISINOPRIL 40 MG: 20 TABLET ORAL at 08:02

## 2025-02-23 RX ADMIN — LEVETIRACETAM 1000 MG: 500 TABLET, FILM COATED ORAL at 08:02

## 2025-02-23 RX ADMIN — CENOBAMATE 200 MG: 200 TABLET, FILM COATED ORAL at 08:02

## 2025-02-23 RX ADMIN — SOTALOL HYDROCHLORIDE 80 MG: 80 TABLET ORAL at 08:02

## 2025-02-23 NOTE — PROGRESS NOTES
At 0130, EMU monitor notified the nurse that the patient has removed his EEG wrap and was picking at his EEG leads. Nurse checked on patient and educated the patient on how he needs to keep the EEG leads on and how sitting in the chair is not a safe option due to his type of seizures. Patient is oriented and is following commands, but insisted on sitting in the chair. No new orders and mother is at bedside. Dr. Palmer made aware. tm.

## 2025-02-23 NOTE — ASSESSMENT & PLAN NOTE
- Chronic and stable  - Follows with Hem/Onc and GI outpatient with upcoming plans for bone marrow biopsy on 2/24 per patient  - holding home eliquis 24h prior to procedure per instructions received by patient

## 2025-02-23 NOTE — ASSESSMENT & PLAN NOTE
44M PMHx of HTN, HLD, atrial fibrillation on Eliquis, LISA on CPAP qhs, fatty liver, depression with anxiety, and seizure disorder currently maintained on Cenobamate 200 mg BID and Levetiracetam 1g BID referred to EMU by Dr. Marte for event capture and characterization of 1) staring episodes occurring multiple times a week and 2) generalized convulsions associated with tongue biting and urinary incontinence last >2 years ago. Per chart review, previous concerns for NEE.     MRI 1/16/21 ([facility]): normal. EEG 7/7/2020 ([facility]): PDR 9-10 Hz, mild left temporal slowing, interictal epileptiform activity from left hemisphere. EEG 3/8/21 ([facility]): nonspecific dysfunction in the left temporal lobe. 72-hour ambulatory ([facility]): normal, no events.     - Received Ativan 2mg IV, 3g keppra IV load on 2/22  - Resumed cenobamate 200mg BID on 2/22  - Resumed keppra 1g PO BID on 2/22  - Continuous vEEG   - Held home Levetiracetam 2/18; Cenobamate 200 mg BID > held 2/19  - LEV 14.4; CNB pending  - Activation procedures per protocol- medication adjustments as above  - IV Ativan PRN for GTC greater than 5 min - please call epilepsy on call   - Seizure precautions, suction and oxygen at bedside  - Fall precautions, side rail padding in place  - Visi monitoring with continuous pulse oximetry   - Telemetry  - 3T MRI scheduled for 3/9 - requested patient's device card for MRI compatability
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
## 2025-02-23 NOTE — PROCEDURES
EEG REPORT      Dustin Crandall Jr.  [id number]  1980    DATE OF SERVICE: 2/22/2025    [facility] -6    METHODOLOGY      Extended electroencephalographic recording is made while the patient is ambulatory and continuing normal daily activities.  Electrodes are placed according to the International 10-20 placement system and included T1 and T2 electrode placement.  Twenty four (24) channels of digital signal (sampling rate of 512/sec) was simultaneously recorded from the scalp including EKG and eye monitors.  Recording band pass was 0.1 to 100 hz and all data was stored digitally on the recorder.  The patient is instructed to press an event button when clinical symptoms occur and write the symptoms into a diary. Activation procedures which include photic stimulation, hyperventilation and instructing patients to perform simple task are done in selected patients.        The EEG is displayed on a monitor screen and can be reformatted into different montages for evaluation.  The entire recoding is submitted for computer assisted analysis to detect spike and electrographic seizure activity.  The entire recording is visually reviewed and the times identified by computer analysis as being spikes or seizures are reviewed again.  Compresses spectral analysis (CSA) is also performed on the activity recorded from each individual channel.  This is displayed as a power display of frequencies from 0 to 30 Hz over time.   The CSA analysis is done and displayed continuously.  This is reviewed for asymmetries in power between homologous areas of the scalp and for presence of changes in power which canbe seen when seizures occur.  Sections of suspected abnormalities on the CSA is then compared with the original EEG recording.  .     [employer] software was also utilized in the review of this study.  This software suite analyzes the EEG recording in multiple domains.  Coherence and rhythmicity is computed to identify EEG sections which  may contain organized seizures.  Each channel undergoes analysis to detect presence of spike and sharp waves which have special and morphological characteristic of epileptic activity.  The routine EEG recording is converted from spacial into frequency domain.  This is then displayed comparing homologous areas to identify areas of significant asymmetry.  Algorithm to identify non-cortically generated artifact is used to separate eye movement, EMG and other artifact from the EEG     Recording Times  Start on 2/22/2025  Stop on 2/23/2025    A total of 23:59:25 hours of EEG was recorded.      EEG FINDINGS:  Background activity:   The background rhythm was characterized by alpha and anterior dominant beta activity with a 10Hz posterior dominant alpha rhythm at 30-70 microvolts.   Symmetry and continuity: there is near continuous focal slowing in the left temporal region     Sleep:   Normal sleep transients including sleep spindles, K complexes, vertex waves and POSTS were seen.    Activation procedures:   NA    Abnormal activity:   Frequent sharp waves at F7 and left anterior temporal rhythmic delta (TIRDA)       IMPRESSION:   Abnormal one day video EEG due to regional cortical or subcortical dysfunction and seizure focus in the left anterior temporal region.      Dixon Ramirez MD  Neurology-Epilepsy.  [facility].

## 2025-02-23 NOTE — PLAN OF CARE
Problem: Adult Inpatient Plan of Care  Goal: Plan of Care Review  Outcome: Progressing  Goal: Optimal Comfort and Wellbeing  Outcome: Progressing     Problem: Fall Injury Risk  Goal: Absence of Fall and Fall-Related Injury  Outcome: Progressing     EEG in place, seizure precautions maintained. No events, wctm.

## 2025-02-23 NOTE — PROCEDURES
EEG REPORT      Dustin Crandall Jr.  [id number]  1980    DATE OF SERVICE: 2/23/2025    [facility] -7    METHODOLOGY      Extended electroencephalographic recording is made while the patient is ambulatory and continuing normal daily activities.  Electrodes are placed according to the International 10-20 placement system and included T1 and T2 electrode placement.  Twenty four (24) channels of digital signal (sampling rate of 512/sec) was simultaneously recorded from the scalp including EKG and eye monitors.  Recording band pass was 0.1 to 100 hz and all data was stored digitally on the recorder.  The patient is instructed to press an event button when clinical symptoms occur and write the symptoms into a diary. Activation procedures which include photic stimulation, hyperventilation and instructing patients to perform simple task are done in selected patients.        The EEG is displayed on a monitor screen and can be reformatted into different montages for evaluation.  The entire recoding is submitted for computer assisted analysis to detect spike and electrographic seizure activity.  The entire recording is visually reviewed and the times identified by computer analysis as being spikes or seizures are reviewed again.  Compresses spectral analysis (CSA) is also performed on the activity recorded from each individual channel.  This is displayed as a power display of frequencies from 0 to 30 Hz over time.   The CSA analysis is done and displayed continuously.  This is reviewed for asymmetries in power between homologous areas of the scalp and for presence of changes in power which canbe seen when seizures occur.  Sections of suspected abnormalities on the CSA is then compared with the original EEG recording.  .     [employer] software was also utilized in the review of this study.  This software suite analyzes the EEG recording in multiple domains.  Coherence and rhythmicity is computed to identify EEG sections which  may contain organized seizures.  Each channel undergoes analysis to detect presence of spike and sharp waves which have special and morphological characteristic of epileptic activity.  The routine EEG recording is converted from spacial into frequency domain.  This is then displayed comparing homologous areas to identify areas of significant asymmetry.  Algorithm to identify non-cortically generated artifact is used to separate eye movement, EMG and other artifact from the EEG     Recording Times    A total of 2:46:34 hours of EEG was recorded.      EEG FINDINGS:  Background activity:   The background rhythm was characterized by alpha and anterior dominant beta activity with a 10Hz posterior dominant alpha rhythm at 30-70 microvolts.   Symmetry and continuity: there is near continuous focal slowing in the left temporal region     Sleep:   Normal sleep transients including sleep spindles, K complexes, vertex waves and POSTS were seen.    Activation procedures:   NA    Abnormal activity:   Frequent sharp waves at F7 and left anterior temporal rhythmic delta (TIRDA)       IMPRESSION:   Abnormal one day video EEG due to regional cortical or subcortical dysfunction and seizure focus in the left anterior temporal region.      Dixon Ramirez MD  Neurology-Epilepsy.  [facility].

## 2025-02-23 NOTE — PLAN OF CARE
[facility] - Neurosurgery (Hospital)  Discharge Final Note    Primary Care Provider: Neli George FNP    Expected Discharge Date: 2/23/2025    Final Discharge Note (most recent)       Final Note - 02/23/25 1144          Final Note    Assessment Type Final Discharge Note     Anticipated Discharge Disposition Home or Self Care     What phone number can be called within the next 1-3 days to see how you are doing after discharge? [phone]     Hospital Resources/Appts/Education Provided Provided patient/caregiver with written discharge plan information;Appointments scheduled and added to AVS        Post-Acute Status    Coverage Payor: MEDICAID - [employer] [place] -     Patient choice form signed by patient/caregiver List with quality metrics by geographic area provided     Discharge Delays None known at this time                     Important Message from Medicare             Patient marked medically ready and is agreeable to discharge. Patient to discharge to home today and has transportation. All necessary follow up appointments have been scheduled and added to patient AVS. No other case management needs at this time.     Future Appointments   Date Time Provider Department Center   3/9/2025  3:00 PM [facility]       INGRID ConleyW, MSW  Case Management  [facility]

## 2025-02-23 NOTE — NURSING
AVS reviewed with patient and patients mom , both verbalise understanding . IV ,tele,and EEG removed . Patient is riding home with mom

## 2025-02-25 DIAGNOSIS — R56.9 SEIZURES: Primary | ICD-10-CM

## 2025-02-25 LAB — CENOBAMATE (XCOPRI), PLASMA: 21.2 UG/ML

## 2025-02-27 ENCOUNTER — OFFICE VISIT (OUTPATIENT)
Dept: NEUROLOGY | Facility: CLINIC | Age: 45
End: 2025-02-27
Payer: MEDICAID

## 2025-02-27 DIAGNOSIS — G47.33 OSA (OBSTRUCTIVE SLEEP APNEA): ICD-10-CM

## 2025-02-27 DIAGNOSIS — F32.4 MAJOR DEPRESSIVE DISORDER WITH SINGLE EPISODE, IN PARTIAL REMISSION: Primary | ICD-10-CM

## 2025-02-27 DIAGNOSIS — Z65.8 PSYCHOSOCIAL STRESSORS: ICD-10-CM

## 2025-02-27 DIAGNOSIS — E78.5 HYPERLIPIDEMIA, UNSPECIFIED HYPERLIPIDEMIA TYPE: ICD-10-CM

## 2025-02-27 DIAGNOSIS — R56.9 SEIZURES: ICD-10-CM

## 2025-02-27 DIAGNOSIS — I48.91 ATRIAL FIBRILLATION, UNSPECIFIED TYPE: ICD-10-CM

## 2025-02-27 DIAGNOSIS — I10 HYPERTENSION, UNSPECIFIED TYPE: ICD-10-CM
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
## 2025-03-09 ENCOUNTER — HOSPITAL ENCOUNTER (OUTPATIENT)
Dept: RADIOLOGY | Facility: HOSPITAL | Age: 45
Discharge: HOME | End: 2025-03-09
Attending: PSYCHIATRY & NEUROLOGY
Payer: MEDICAID

## 2025-03-09 DIAGNOSIS — R56.9 SEIZURES: ICD-10-CM

## 2025-03-09 PROCEDURE — 70551 MRI BRAIN STEM W/O DYE: CPT | Mod: 26,,, | Performed by: RADIOLOGY

## 2025-03-09 PROCEDURE — 70551 MRI BRAIN STEM W/O DYE: CPT | Mod: TC

## 2025-04-10 ENCOUNTER — OFFICE VISIT (OUTPATIENT)
Dept: NEUROLOGY | Facility: CLINIC | Age: 45
End: 2025-04-10
Payer: MEDICAID

## 2025-04-10 ENCOUNTER — TELEPHONE (OUTPATIENT)
Dept: NEUROLOGY | Facility: CLINIC | Age: 45
End: 2025-04-10
Payer: MEDICAID

## 2025-04-10 DIAGNOSIS — I48.91 ATRIAL FIBRILLATION, UNSPECIFIED TYPE: ICD-10-CM

## 2025-04-10 DIAGNOSIS — E78.5 HYPERLIPIDEMIA, UNSPECIFIED HYPERLIPIDEMIA TYPE: ICD-10-CM

## 2025-04-10 DIAGNOSIS — G47.33 OSA (OBSTRUCTIVE SLEEP APNEA): ICD-10-CM

## 2025-04-10 DIAGNOSIS — I10 HYPERTENSION, UNSPECIFIED TYPE: Primary | ICD-10-CM

## 2025-04-10 DIAGNOSIS — F32.4 MAJOR DEPRESSIVE DISORDER WITH SINGLE EPISODE, IN PARTIAL REMISSION: ICD-10-CM

## 2025-04-10 DIAGNOSIS — R56.9 SEIZURES: ICD-10-CM

## 2025-04-10 DIAGNOSIS — Z65.8 PSYCHOSOCIAL STRESSORS: ICD-10-CM

## 2025-04-10 PROCEDURE — 99499 UNLISTED E&M SERVICE: CPT | Mod: S$PBB,,, | Performed by: STUDENT IN AN ORGANIZED HEALTH CARE EDUCATION/TRAINING PROGRAM

## 2025-04-10 PROCEDURE — 99212 OFFICE O/P EST SF 10 MIN: CPT | Mod: PBBFAC | Performed by: STUDENT IN AN ORGANIZED HEALTH CARE EDUCATION/TRAINING PROGRAM

## 2025-04-10 PROCEDURE — 99999 PR PBB SHADOW E&M-EST. PATIENT-LVL II: CPT | Mod: PBBFAC,,, | Performed by: STUDENT IN AN ORGANIZED HEALTH CARE EDUCATION/TRAINING PROGRAM

## 2025-04-10 NOTE — PROGRESS NOTES
"NEUROPSYCHOLOGY CONSULT    Referral Information  NAME:  Dustin Crandall DATE OF SERVICE: 04/10/2025   MRN#:  [id number] EDUCATION: 8   AGE: 44 y.o. HANDEDNESS: Right    : 1980 RACE: White   SEX: Male REFERRAL: Dixon Ramirez MD;  Neurology, [facility]     Evaluation methods: I had the pleasure of seeing Dustin Crandall on 04/10/2025 in person at the [facility], Department of Neurology. At the outset of the appointment, the undersigned explained the rationale for the evaluation along with the limits of confidentiality; and verbal informed consent for this evaluation was obtained. As below, consent was revoked and planned procedures were not completed.     The chief complaint/medical necessity leading to consultation/medical necessity is: Presurgical evaluation for consideration of epilepsy surgery.     NEUROPSYCHOLOGICAL EVALUATION - CONFIDENTIAL    SUMMARY/TREATMENT PLAN   Summary of History:  Mr. Crandall is a 44 y.o., right-handed, White, male with 8 years of formal education. He was referred by his epileptologist for epilepsy presurgical evaluation. Medical history is additionally notable for hypertension, hyperlipidemia, atrial fibrillation, and obstructive sleep apnea (LISA). Recent EMU evaluation from 2025-2025 captured seizures and interictal epileptiform activity typically arising from the left temporal region. Most-recent neurologic exam completed on 2025 was documented as normal. His neurologist noted that since discontinuing paroxetine due to reported sedation, his mother has observed "mood swings." Most-recent brain imaging is an unremarkable head CT from 08/10/2024; epilepsy protocol brain MRI is scheduled for 2025.      During interview, Mr. Crandall and his mother reported the gradual onset and worsening course of cognitive concerns in the context of frequent uncontrolled seizures. Mr. Crandall's mother has perceived changes in cognition over approximately the past three " years, while Mr. Crandall has perceived more-precipitous worsening over the las few weeks to months shortly prior to his EMU evaluation. They discussed primary difficulties with memory for recent information and rapid forgetting, as well as frequently losing his train of thought in conversation; however, otherwise have not perceived significant consistent changes. Mild variable executive functioning difficulties were reported by his mother, and both attributed some recent changes in his mood and personality that were attributed to withdrawal from Paxil which he discontinued approximately two weeks ago. Functionally, Mr. Crandall remains generally independent; however, he has difficulties retaining information about his medical care. For this reason, his mother attends visits with him to assist.      Emotionally, Mr. Crandall discussed a history of depression with onset several years ago during his divorce for which he was initially prescribed Paxil. He also discussed significant current psychosocial stressors including his brother's upcoming heart surgery, being  from his children, and his above epilepsy history. At this time he denied clinically significant depression symptoms, noting transient symptoms of low mood precipitated by stressors. He discussed hypersomnolence (8-12 hours per day) and fatigue are present.     Test Results: Shortly after commencing testing, Mr. Crandall stated his disinterest in continuing with testing. The examiner communicated the rationale for testing, importance of testing for presurgical purposes, and offered a consultation with the undersigned on this date to discuss his decision. Mr. Crandall declined to discuss the matter further, revoked consent to test, and left the office. Insufficient data were collected for interpretation.       Diagnoses from history/ interview  1. Hypertension, unspecified type        2. Seizures  Ambulatory referral/consult to Adult Neuropsychology      3.  Hyperlipidemia, unspecified hyperlipidemia type        4. Atrial fibrillation, unspecified type        5. LISA (obstructive sleep apnea)        6. Major depressive disorder with single episode, in partial remission        7. Psychosocial stressors             Recommendations:   If Mr. Crandall wishes to proceed with testing, I would be happy to work with him to complete neuropsychological evaluation.       RESULTS     Tests Administered (manual norms used unless otherwise indicated): [employer] - Test of Premorbid Functioning (TOPF), Dot Counting Test (DCT), TOMM trial 1.     Mr. Crandall revoked consent for testing after administration of these measures. Insufficient data were gathered for interpretation and no data are presented.      Billing Documentation     Time on review of neuropsychological test data and relevant records, data interpretation, providing feedback about these results, and writing/ documentation: <30 minutes; no billing submitted for my services in relation to this encounter.

## 2025-04-10 NOTE — TELEPHONE ENCOUNTER
Pt called asking about their EMU results and asking what the next steps were. I told pt I would forward their message to our EMU coordinator, Tierney who would be able to assist him with his questions. Patient verbalized agreement

## 2025-04-11 ENCOUNTER — TELEPHONE (OUTPATIENT)
Dept: NEUROLOGY | Facility: CLINIC | Age: 45
End: 2025-04-11
Payer: MEDICAID

## 2025-04-11 NOTE — TELEPHONE ENCOUNTER
----- Message from Dixon Ramirez MD sent at 4/11/2025  8:50 AM CDT -----  Regarding: RE: Appt  I messaged with Estuardo about him yesterday.  This is the patient mentioned in surgery meeting yesterday who abruptly discontinued his neuropsychology testing before any meaningful data could be collected.  The plan moving forward is for him to reschedule it and not do that again.  Can schedule next available appointment with me.  He has to contact Estuardo's office regarding medications in the mean time although she's currently in the process of moving [facility] so someone from [facility] might have to handle the meds until he gets into clinic with me if that's what he wants.  It would not be appropriate for me to manage outpatient medications for someone I only know through the [facility].  ----- Message -----  From: Tierney Muniz RN  Sent: 4/11/2025   8:14 AM CDT  To: Kay Rock MA; MELISSA Le#  Subject: Appt                                             According to this patient's EMU d/c summary he was to f/u with Dr. Ramirez- Dr. Marte referred him to EMU. An appt was never scheduled. He is now calling for EMU results and has questions regarding plan moving forward. Ja, could you assist with scheduling him an appt? Ty!!!

## 2025-04-15 ENCOUNTER — TELEPHONE (OUTPATIENT)
Dept: NEUROLOGY | Facility: CLINIC | Age: 45
End: 2025-04-15
Payer: MEDICAID

## 2025-04-15 NOTE — TELEPHONE ENCOUNTER
Called pt about his EMU question. I informed pt that he needs to reach out to his provider, Dr. Marte, for results. Also informed pt that he will likely need a f/u appt and he should contact his ordering provider. Patient verbalized agreement